# Patient Record
Sex: FEMALE | Race: WHITE | NOT HISPANIC OR LATINO | Employment: OTHER | URBAN - NONMETROPOLITAN AREA
[De-identification: names, ages, dates, MRNs, and addresses within clinical notes are randomized per-mention and may not be internally consistent; named-entity substitution may affect disease eponyms.]

---

## 2017-01-09 ENCOUNTER — OFFICE VISIT (OUTPATIENT)
Dept: INTERNAL MEDICINE | Facility: CLINIC | Age: 67
End: 2017-01-09

## 2017-01-09 VITALS
TEMPERATURE: 96.8 F | RESPIRATION RATE: 14 BRPM | OXYGEN SATURATION: 98 % | BODY MASS INDEX: 28.01 KG/M2 | WEIGHT: 152.19 LBS | HEART RATE: 78 BPM | DIASTOLIC BLOOD PRESSURE: 68 MMHG | HEIGHT: 62 IN | SYSTOLIC BLOOD PRESSURE: 110 MMHG

## 2017-01-09 DIAGNOSIS — M79.644 FINGER PAIN, RIGHT: ICD-10-CM

## 2017-01-09 DIAGNOSIS — J01.40 ACUTE NON-RECURRENT PANSINUSITIS: Primary | ICD-10-CM

## 2017-01-09 PROCEDURE — 99213 OFFICE O/P EST LOW 20 MIN: CPT | Performed by: PHYSICIAN ASSISTANT

## 2017-01-09 RX ORDER — AZELASTINE 1 MG/ML
2 SPRAY, METERED NASAL 2 TIMES DAILY
Qty: 1 EACH | Refills: 12 | Status: SHIPPED | OUTPATIENT
Start: 2017-01-09 | End: 2018-05-03

## 2017-01-09 RX ORDER — LIDOCAINE AND PRILOCAINE 25; 25 MG/G; MG/G
CREAM TOPICAL AS NEEDED
COMMUNITY
Start: 2016-12-14 | End: 2018-10-31

## 2017-01-09 RX ORDER — CEFPROZIL 500 MG/1
500 TABLET, FILM COATED ORAL 2 TIMES DAILY
Qty: 20 TABLET | Refills: 0 | Status: SHIPPED | OUTPATIENT
Start: 2017-01-09 | End: 2017-01-19

## 2017-01-09 NOTE — MR AVS SNAPSHOT
Dang Butler   1/9/2017 2:00 PM   Office Visit    Provider:  STEVEN Eastman   Department:  [facility] PRIMARY [facility]   Dept Phone:  [phone]                Your Full Care Plan              Today's Medication Changes          These changes are accurate as of: 1/9/17  3:42 PM.  If you have any questions, ask your nurse or doctor.               New Medication(s)Ordered:     azelastine 0.1 % nasal spray   Commonly known as:  ASTELIN   2 sprays into each nostril 2 (Two) Times a Day. Use in each nostril as directed       cefprozil 500 MG tablet   Commonly known as:  CEFZIL   Take 1 tablet by mouth 2 (Two) Times a Day for 10 days.            Where to Get Your Medications      [place] [address] [address] [facility] [phone] [facility] [phone] [facility] [address]     Phone:  [phone]     azelastine 0.1 % nasal spray    cefprozil 500 MG tablet                  Your Updated Medication List          This list is accurate as of: 1/9/17  3:42 PM.  Always use your most recent med list.                azelastine 0.1 % nasal spray   Commonly known as:  ASTELIN   2 sprays into each nostril 2 (Two) Times a Day. Use in each nostril as directed       cefprozil 500 MG tablet   Commonly known as:  CEFZIL   Take 1 tablet by mouth 2 (Two) Times a Day for 10 days.       cetirizine 10 MG tablet   Commonly known as:  zyrTEC       estradiol 0.1 MG/GM vaginal cream   Commonly known as:  ESTRACE   Insert 2 g into the vagina daily. Apply a small amount three times weekly       fluocinonide 0.05 % cream   Commonly known as:  LIDEX       levothyroxine 137 MCG tablet   Commonly known as:  SYNTHROID, LEVOTHROID   Take 1 tablet by mouth Daily. As directed       lidocaine-prilocaine 2.5-2.5 % cream   Commonly known as:  EMLA       montelukast 10 MG tablet   Commonly known as:  SINGULAIR   Take 1 tablet  by mouth every night.       pantoprazole 40 MG EC tablet   Commonly known as:  PROTONIX   Take 1 tablet by mouth Daily.       promethazine-dextromethorphan 6.25-15 MG/5ML syrup   Commonly known as:  PROMETHAZINE-DM   Take 5-10 mL by mouth at bedtime as needed for cough.       raNITIdine 150 MG tablet   Commonly known as:  ZANTAC   Take 1 tablet by mouth 2 (Two) Times a Day. For reflux       triamcinolone 0.1 % cream   Commonly known as:  KENALOG   Apply  topically 2 (two) times a day.       zolpidem 10 MG tablet   Commonly known as:  AMBIEN   Take 1 tablet by mouth At Night As Needed for sleep.               You Were Diagnosed With        Codes Comments    Acute non-recurrent pansinusitis    -  Primary ICD-10-CM: J01.40  ICD-9-CM: 461.8     Finger pain, right     ICD-10-CM: M79.644  ICD-9-CM: 729.5       Instructions     None    Patient Instructions History      [employer] Signup     Our records indicate that you have an active [employer] account.    You can view your After Visit Summary by going to BERD and logging in with your [employer] username and password.  If you don't have a [employer] username and password but a parent or guardian has access to your record, the parent or guardian should login with their own [employer] username and password and access your record to view the After Visit Summary.    If you have questions, you can email [email or web address] or call [phone] to talk to our [employer] staff.  Remember, [employer] is NOT to be used for urgent needs.  For medical emergencies, dial 911.               Other Info from Your Visit           Your Appointments     Apr 27, 2017  1:15 PM EDT   Follow Up with Marilyn K Vermeesch, MD   [facility] (--)    [address] [place] [address] [address]  [facility] [address] [phone]           Arrive 15 minutes prior to appointment.              Allergies     Amoxicillin        Reason for Visit     Sinus Problem for  "over a week, usually only tolerates Cefzil.       Vital Signs     Blood Pressure Pulse Temperature Respirations Height Weight    110/68 78 96.8 °F (36 °C) 14 62\" (157.5 cm) 152 lb 3 oz (69 kg)    Oxygen Saturation Body Mass Index Smoking Status             98% 27.84 kg/m2 Never Smoker         Problems and Diagnoses Noted     Acute non-recurrent pansinusitis    -  Primary    Finger pain, right          "

## 2017-03-14 ENCOUNTER — TELEPHONE (OUTPATIENT)
Dept: INTERNAL MEDICINE | Facility: CLINIC | Age: 67
End: 2017-03-14

## 2017-03-14 DIAGNOSIS — G47.09 OTHER INSOMNIA: ICD-10-CM

## 2017-03-14 RX ORDER — ZOLPIDEM TARTRATE 10 MG/1
10 TABLET ORAL NIGHTLY PRN
Qty: 30 TABLET | Refills: 2 | OUTPATIENT
Start: 2017-03-14 | End: 2017-06-12 | Stop reason: SDUPTHER

## 2017-03-14 NOTE — TELEPHONE ENCOUNTER
----- Message from Dang Butler sent at 3/14/2017  2:20 PM EDT -----  Regarding: Prescription Question  Contact: [phone]  I am needing to get Amb[facility] please[facility]    Thank you

## 2017-03-30 DIAGNOSIS — R14.0 ABDOMINAL BLOATING: Primary | ICD-10-CM

## 2017-05-10 ENCOUNTER — LAB REQUISITION (OUTPATIENT)
Dept: LAB | Facility: HOSPITAL | Age: 67
End: 2017-05-10

## 2017-05-10 ENCOUNTER — OFFICE VISIT (OUTPATIENT)
Dept: INTERNAL MEDICINE | Facility: CLINIC | Age: 67
End: 2017-05-10

## 2017-05-10 VITALS
HEIGHT: 62 IN | WEIGHT: 152.25 LBS | BODY MASS INDEX: 28.02 KG/M2 | TEMPERATURE: 97.7 F | OXYGEN SATURATION: 98 % | DIASTOLIC BLOOD PRESSURE: 70 MMHG | SYSTOLIC BLOOD PRESSURE: 116 MMHG | HEART RATE: 71 BPM

## 2017-05-10 DIAGNOSIS — K21.9 GASTROESOPHAGEAL REFLUX DISEASE, ESOPHAGITIS PRESENCE NOT SPECIFIED: ICD-10-CM

## 2017-05-10 DIAGNOSIS — E07.9 THYROID DISORDER: ICD-10-CM

## 2017-05-10 DIAGNOSIS — E78.2 MIXED HYPERLIPIDEMIA: Primary | ICD-10-CM

## 2017-05-10 DIAGNOSIS — E55.9 VITAMIN D DEFICIENCY: ICD-10-CM

## 2017-05-10 DIAGNOSIS — E07.9 DISORDER OF THYROID: ICD-10-CM

## 2017-05-10 DIAGNOSIS — R10.13 EPIGASTRIC PAIN: ICD-10-CM

## 2017-05-10 LAB
BUN SERPL-MCNC: 17 MG/DL (ref 7–20)
BUN/CREAT SERPL: 21.3 (ref 7.1–23.5)
CALCIUM SERPL-MCNC: 9.6 MG/DL (ref 8.4–10.2)
CHLORIDE SERPL-SCNC: 103 MMOL/L (ref 98–107)
CO2 SERPL-SCNC: 27 MMOL/L (ref 26–30)
CREAT SERPL-MCNC: 0.8 MG/DL (ref 0.6–1.3)
GLUCOSE SERPL-MCNC: 74 MG/DL (ref 74–98)
POTASSIUM SERPL-SCNC: 4.3 MMOL/L (ref 3.5–5.1)
SODIUM SERPL-SCNC: 141 MMOL/L (ref 137–145)

## 2017-05-10 PROCEDURE — 87338 HPYLORI STOOL AG IA: CPT | Performed by: INTERNAL MEDICINE

## 2017-05-10 PROCEDURE — 99213 OFFICE O/P EST LOW 20 MIN: CPT | Performed by: INTERNAL MEDICINE

## 2017-05-10 RX ORDER — LEVOTHYROXINE SODIUM 137 UG/1
137 TABLET ORAL DAILY
Qty: 90 TABLET | Refills: 1 | Status: SHIPPED | OUTPATIENT
Start: 2017-05-10 | End: 2017-11-07 | Stop reason: SDUPTHER

## 2017-05-11 ENCOUNTER — TELEPHONE (OUTPATIENT)
Dept: INTERNAL MEDICINE | Facility: CLINIC | Age: 67
End: 2017-05-11

## 2017-05-12 LAB — H PYLORI AG STL QL IA: NEGATIVE

## 2017-06-12 DIAGNOSIS — G47.09 OTHER INSOMNIA: ICD-10-CM

## 2017-06-12 RX ORDER — ZOLPIDEM TARTRATE 10 MG/1
10 TABLET ORAL NIGHTLY PRN
Qty: 30 TABLET | Refills: 2 | OUTPATIENT
Start: 2017-06-12 | End: 2017-06-13 | Stop reason: SDUPTHER

## 2017-06-12 RX ORDER — PANTOPRAZOLE SODIUM 40 MG/1
40 TABLET, DELAYED RELEASE ORAL DAILY
Qty: 90 TABLET | Refills: 3 | Status: SHIPPED | OUTPATIENT
Start: 2017-06-12 | End: 2018-07-05 | Stop reason: SDUPTHER

## 2017-06-12 NOTE — TELEPHONE ENCOUNTER
----- Message from Dang Butler sent at 6/11/2017  3:09 PM EDT -----  Regarding: Prescription Question  Contact: [phone]  Garcia urrutia & pantoprazole refill - [facility] pharmacy     Thanks

## 2017-06-13 RX ORDER — ZOLPIDEM TARTRATE 10 MG/1
10 TABLET ORAL NIGHTLY PRN
Qty: 30 TABLET | Refills: 2 | OUTPATIENT
Start: 2017-06-13 | End: 2018-01-18 | Stop reason: SDUPTHER

## 2017-07-26 ENCOUNTER — OFFICE VISIT (OUTPATIENT)
Dept: INTERNAL MEDICINE | Facility: CLINIC | Age: 67
End: 2017-07-26

## 2017-07-26 VITALS
DIASTOLIC BLOOD PRESSURE: 66 MMHG | SYSTOLIC BLOOD PRESSURE: 118 MMHG | TEMPERATURE: 97.9 F | OXYGEN SATURATION: 98 % | HEART RATE: 73 BPM

## 2017-07-26 DIAGNOSIS — M25.561 ACUTE PAIN OF RIGHT KNEE: Primary | ICD-10-CM

## 2017-07-26 PROCEDURE — 99213 OFFICE O/P EST LOW 20 MIN: CPT | Performed by: INTERNAL MEDICINE

## 2017-07-26 NOTE — PROGRESS NOTES
Chief Complaint   Patient presents with   • Abstract     Pt states she slipped and hit right knee on the wall about 6 weeks ago. Pt states she's still having knee pain.      Subjective   Dang Butler is a 66 y.o. female.     HPI Comments: Pt here with complaints of right knee pain for past 6 weeks.   She slipped on a dryer sheet and landed knee down on the wall and floor.   Had some swelling, she applied ice for a few days.   Has pain posterior to knee and over patella, but pain radiates down posterior calf, over achilles tendon to her foot.   The pain is constant, but intensity comes and goes.   Has been applying heat around leg lately and this helps some.   At night she occasionally awakens to take aleve.  Knee does not feel as though it will give way.   She did see the chiropractor, does help for a few days.  No XR was done.        The following portions of the patient's history were reviewed and updated as appropriate: allergies, current medications, past family history, past medical history, past social history, past surgical history and problem list.    Review of Systems   Musculoskeletal: Positive for arthralgias and gait problem. Negative for joint swelling.       Objective   /66  Pulse 73  Temp 97.9 °F (36.6 °C)  SpO2 98%  There is no height or weight on file to calculate BMI.  Physical Exam   Constitutional: She is oriented to person, place, and time. She appears well-developed and well-nourished.   HENT:   Head: Normocephalic and atraumatic.   Pulmonary/Chest: Effort normal.   Musculoskeletal:   Minimally antalgic gait due to pain, right knee with minimal inflammation, no warmth, no ballottement or effusion, no pain to palpation of patella, no pain over posterior fossa, full extension with no pain, patient has pain with flexion past 110°, positive drawer sign, negative Lachman sign, no pain with eversion or inversion   Neurological: She is alert and oriented to person, place, and time.    Psychiatric: She has a normal mood and affect. Judgment normal.   Nursing note reviewed.      Assessment/Plan   Dang Butler is here today and the following problems have been addressed:      Dang was seen today for abstract.    Diagnoses and all orders for this visit:    Acute pain of right knee  -     XR Knee 3 View Right; Future    XR right knee  Take tylenol or aleve for pain  Recommend heat to knee prn  Suspect internal derangement of knee, possible ACL or PCL injury based on history and exam  Will call pt with results of XRay, suspect she will need MRI      Please note that portions of this note were completed with a voice recognition program.  Efforts were made to edit dictation, but occasionally words are mistranscribed.

## 2017-07-27 ENCOUNTER — HOSPITAL ENCOUNTER (OUTPATIENT)
Dept: GENERAL RADIOLOGY | Facility: HOSPITAL | Age: 67
Discharge: HOME | End: 2017-07-27
Attending: INTERNAL MEDICINE | Admitting: INTERNAL MEDICINE

## 2017-07-27 DIAGNOSIS — M25.561 ACUTE PAIN OF RIGHT KNEE: ICD-10-CM

## 2017-07-27 PROCEDURE — 73562 X-RAY EXAM OF KNEE 3: CPT

## 2017-07-28 ENCOUNTER — TELEPHONE (OUTPATIENT)
Dept: INTERNAL MEDICINE | Facility: CLINIC | Age: 67
End: 2017-07-28

## 2017-07-28 DIAGNOSIS — M25.561 ACUTE PAIN OF RIGHT KNEE: Primary | ICD-10-CM

## 2017-07-28 NOTE — TELEPHONE ENCOUNTER
----- Message from Marilyn K Vermeesch, MD sent at 7/28/2017  3:40 PM EDT -----  Please tell patient that x-ray reveals arthritis changes in her knee.  I am ordering an MRI of her right knee to rule out underlying tear of her anterior or posterior cruciate ligament.

## 2017-08-10 ENCOUNTER — HOSPITAL ENCOUNTER (OUTPATIENT)
Dept: MRI IMAGING | Facility: HOSPITAL | Age: 67
Discharge: HOME | End: 2017-08-10
Admitting: INTERNAL MEDICINE

## 2017-08-10 ENCOUNTER — TELEPHONE (OUTPATIENT)
Dept: INTERNAL MEDICINE | Facility: CLINIC | Age: 67
End: 2017-08-10

## 2017-08-10 DIAGNOSIS — M25.561 ACUTE PAIN OF RIGHT KNEE: ICD-10-CM

## 2017-08-10 PROCEDURE — 73721 MRI JNT OF LWR EXTRE W/O DYE: CPT

## 2017-08-10 NOTE — TELEPHONE ENCOUNTER
Patient is calling in regards to her MRI done today. She would like a callback with the results.

## 2017-08-11 ENCOUNTER — TELEPHONE (OUTPATIENT)
Dept: INTERNAL MEDICINE | Facility: CLINIC | Age: 67
End: 2017-08-11

## 2017-08-11 DIAGNOSIS — S83.206A ACUTE MENISCAL TEAR OF KNEE, RIGHT, INITIAL ENCOUNTER: Primary | ICD-10-CM

## 2017-08-11 NOTE — TELEPHONE ENCOUNTER
----- Message from Marilyn K Vermeesch, MD sent at 8/10/2017  5:16 PM EDT -----  Please tell patient that MRI reveals tear of her posterior cruciate ligament, tear of the medial meniscus as well as osteoarthritis and a large Baker's cyst.  She will need to see orthopedic surgeon, does she have a preference of whom she wants to se  e?

## 2017-08-11 NOTE — TELEPHONE ENCOUNTER
Pt notified of results. Pt states Dr. Springer was recommended to her but she would like to know what you think. If you agree, then go ahead with referral.

## 2017-08-15 ENCOUNTER — TELEPHONE (OUTPATIENT)
Dept: INTERNAL MEDICINE | Facility: CLINIC | Age: 67
End: 2017-08-15

## 2017-08-15 NOTE — TELEPHONE ENCOUNTER
CALLED TO [facility] - PATIENT INFORMED OF DIRECTIONS. SHE STATES SHE HAS BEEN ON THIS MEDICATION FOR 20 YEARS - SHE HAS TRIED CUTTING DOWN IN THE PAST AND IT JUST DID NOT WORK. HER NEXT APPOINTMENT IS November. PLEASE ADVISE ON ANY CHANGES OR IF YOU WOULD LIKE FOR [facility]ZA TO COME IN.

## 2017-08-15 NOTE — TELEPHONE ENCOUNTER
Please call her and tell her I really would like her to work with me on this, and tell her that some of your patients have also complained of memory loss regarding this medication.  I do not want to go up to 30 tablets a month, 25 tablets.

## 2017-08-15 NOTE — TELEPHONE ENCOUNTER
Please call in Ambien 10 mg by mouth daily at bedtime when necessary only #25 tablets with 2 refills.  Please tell patient I want her to start working on using a half a tablet once in a while, as physicians are now being monitored closely with controlled substances .  Please tell her I'm asking her to work with me on this.

## 2017-08-15 NOTE — TELEPHONE ENCOUNTER
----- Message from Dang Butler sent at 8/15/2017 10:16 AM EDT -----  Regarding: Prescription Question  Contact: [phone]  I am in need of ambian refill - Thank you    [facility] pharmacy

## 2017-08-15 NOTE — TELEPHONE ENCOUNTER
PATIENT INFORMED - SHE SAYS SHE WILL TRY TO WORK WITH US ON IT. SHE IS AWARE OF ALL THE SIDE EFFECTS, AND MEMORY LOSS, SHE SAYS AFTER 25 YEARS AMBIEN IS THE ONLY THING THAT HELPS A LITTLE

## 2017-09-07 RX ORDER — MONTELUKAST SODIUM 10 MG/1
TABLET ORAL
Qty: 30 TABLET | Refills: 10 | Status: SHIPPED | OUTPATIENT
Start: 2017-09-07 | End: 2018-08-08 | Stop reason: SDUPTHER

## 2017-11-03 ENCOUNTER — OFFICE VISIT (OUTPATIENT)
Dept: INTERNAL MEDICINE | Facility: CLINIC | Age: 67
End: 2017-11-03

## 2017-11-03 VITALS
HEIGHT: 62 IN | TEMPERATURE: 97.5 F | DIASTOLIC BLOOD PRESSURE: 68 MMHG | WEIGHT: 153 LBS | OXYGEN SATURATION: 96 % | HEART RATE: 70 BPM | SYSTOLIC BLOOD PRESSURE: 122 MMHG | BODY MASS INDEX: 28.16 KG/M2

## 2017-11-03 DIAGNOSIS — F51.01 PRIMARY INSOMNIA: ICD-10-CM

## 2017-11-03 DIAGNOSIS — K21.9 GASTROESOPHAGEAL REFLUX DISEASE WITHOUT ESOPHAGITIS: ICD-10-CM

## 2017-11-03 DIAGNOSIS — E07.9 THYROID DISORDER: Primary | ICD-10-CM

## 2017-11-03 DIAGNOSIS — R19.5 LOOSE STOOLS: ICD-10-CM

## 2017-11-03 DIAGNOSIS — E55.9 VITAMIN D DEFICIENCY: ICD-10-CM

## 2017-11-03 PROBLEM — M25.561 ACUTE PAIN OF RIGHT KNEE: Status: RESOLVED | Noted: 2017-07-26 | Resolved: 2017-11-03

## 2017-11-03 PROCEDURE — 99214 OFFICE O/P EST MOD 30 MIN: CPT | Performed by: INTERNAL MEDICINE

## 2017-11-03 PROCEDURE — 90662 IIV NO PRSV INCREASED AG IM: CPT | Performed by: INTERNAL MEDICINE

## 2017-11-03 PROCEDURE — G0008 ADMIN INFLUENZA VIRUS VAC: HCPCS | Performed by: INTERNAL MEDICINE

## 2017-11-03 RX ORDER — DESOXIMETASONE 0.5 MG/G
CREAM TOPICAL DAILY
Qty: 60 G | Refills: 1 | Status: SHIPPED | OUTPATIENT
Start: 2017-11-03 | End: 2018-10-25 | Stop reason: SDUPTHER

## 2017-11-03 NOTE — PROGRESS NOTES
"Chief Complaint   Patient presents with   • Follow-up     6 months for GERD, HLD, and Thyroid disorder. Pt is requesting refill on Topicort, not on med list, pended for approval.      Subjective   Dang Butler is a 67 y.o. female.     HPI Comments: Here for followup visit.  PMH of HLD, hypothyroid, allergies, GERD and insomnia.   She is using protonix in AM on empty stomach and takes zantac about twice a week in PM.   She tries to follow a gluten free diet, but if she eats gluten she has sxs.  She ate bread earlier this week and felt fatigued and throat felt itchy.  She always feels bloated and has some loose stools.  She is sleeping poorly currently. She takes ambien every night to sleep.  She is currently on 3/4 tab.  She is taking vit D 1000 u daily.   Her allergies are not too bad, currently on zyrtec with prn singulair.   She is using estrace cream every other night, this helps her bladder prolapse.  This is working well for her.   Vaccines are UTD.  She declines colonoscopy.   She had mammogram in June this yr at [facility].       The following portions of the patient's history were reviewed and updated as appropriate: allergies, current medications, past family history, past medical history, past social history, past surgical history and problem list.    Review of Systems   Gastrointestinal:        Loose and frequent stools  Rare GERD symptoms unless patient eats something spicy   Endocrine: Negative for cold intolerance and heat intolerance.   Allergic/Immunologic: Positive for environmental allergies.   Psychiatric/Behavioral: Positive for sleep disturbance.   All other systems reviewed and are negative.      Objective   /68  Pulse 70  Temp 97.5 °F (36.4 °C)  Ht 62\" (157.5 cm)  Wt 153 lb (69.4 kg)  SpO2 96%  BMI 27.98 kg/m2  Body mass index is 27.98 kg/(m^2).  Physical Exam   Constitutional: She is oriented to person, place, and time. She appears well-developed and well-nourished.   HENT: "   Head: Normocephalic and atraumatic.   Mouth/Throat: Oropharynx is clear and moist.   Eyes: Conjunctivae and EOM are normal. Pupils are equal, round, and reactive to light.   Neck: Normal range of motion. Neck supple. No thyromegaly present.   Cardiovascular: Normal rate, regular rhythm, normal heart sounds and intact distal pulses.    No murmur heard.  Pulmonary/Chest: Effort normal and breath sounds normal. No respiratory distress.   Abdominal: Soft. Bowel sounds are normal. She exhibits no distension. There is no tenderness.   Musculoskeletal: She exhibits no edema.   Lymphadenopathy:     She has no cervical adenopathy.   Neurological: She is alert and oriented to person, place, and time. No cranial nerve deficit.   Psychiatric: She has a normal mood and affect. Judgment normal.   Nursing note and vitals reviewed.      Assessment/Plan   Dang Butler is here today and the following problems have been addressed:      Dang was seen today for follow-up.    Diagnoses and all orders for this visit:    Thyroid disorder  -     TSH    Vitamin D deficiency  -     Vitamin D 25 Hydroxy    Primary insomnia    Gastroesophageal reflux disease without esophagitis    Loose stools  -     Celiac Comprehensive Panel    Other orders  -     desoximetasone (TOPICORT) 0.05 % cream; Apply  topically Daily.    Labs as noted including celiac panel  Flu shot today  No med changes  Continue protonix with prn zantac  Given refill of Topicort for use on rash under breast when necessary  Patient continues to use Ambien every night, however has decreased to three quarters tab daily at bedtime    RTC  6mo  Please note that portions of this note were completed with a voice recognition program.  Efforts were made to edit dictation, but occasionally words are mistranscribed.

## 2017-11-06 LAB
25(OH)D3+25(OH)D2 SERPL-MCNC: 31.2 NG/ML
ENDOMYSIUM IGA SER QL: NEGATIVE
GLIADIN PEPTIDE IGA SER-ACNC: 5 UNITS (ref 0–19)
GLIADIN PEPTIDE IGG SER-ACNC: 2 UNITS (ref 0–19)
IGA SERPL-MCNC: 293 MG/DL (ref 87–352)
TSH SERPL DL<=0.005 MIU/L-ACNC: 0.35 MIU/ML (ref 0.47–4.68)
TTG IGA SER-ACNC: <2 U/ML (ref 0–3)
TTG IGG SER-ACNC: <2 U/ML (ref 0–5)

## 2017-11-07 ENCOUNTER — TELEPHONE (OUTPATIENT)
Dept: INTERNAL MEDICINE | Facility: CLINIC | Age: 67
End: 2017-11-07

## 2017-11-07 DIAGNOSIS — E07.9 DISORDER OF THYROID: ICD-10-CM

## 2017-11-07 DIAGNOSIS — E07.9 THYROID DISORDER: Primary | ICD-10-CM

## 2017-11-07 RX ORDER — LEVOTHYROXINE SODIUM 0.12 MG/1
137 TABLET ORAL DAILY
Qty: 30 TABLET | Refills: 1 | Status: SHIPPED | OUTPATIENT
Start: 2017-11-07 | End: 2017-12-20 | Stop reason: SDUPTHER

## 2017-11-07 NOTE — TELEPHONE ENCOUNTER
Notes Recorded by Marilyn K Vermeesch, MD on 11/7/2017 at 7:47 AM  Please tell patient vitamin D and celiac panel are in normal range.  There is no evidence of celiac disease.  ------

## 2017-11-07 NOTE — TELEPHONE ENCOUNTER
----- Message from Marilyn K Vermeesch, MD sent at 11/7/2017  7:46 AM EST -----  Please tell patient that her thyroid labs suggest we need to decrease her current medication of levothyroxine down to 125 µg daily.  Please call in #30 tablets with one refill.  Please order TSH and free T4 to be done in 6 weeks.  Please tell patient   if her labs are appropriate at that time we will call in refill of medication.

## 2017-11-08 LAB
T4 FREE SERPL-MCNC: 3.47 NG/DL (ref 0.78–2.19)
WRITTEN AUTHORIZATION: NORMAL

## 2017-12-18 LAB
T4 FREE SERPL-MCNC: 2.01 NG/DL (ref 0.78–2.19)
TSH SERPL DL<=0.005 MIU/L-ACNC: 0.47 MIU/ML (ref 0.47–4.68)

## 2017-12-20 ENCOUNTER — TELEPHONE (OUTPATIENT)
Dept: INTERNAL MEDICINE | Facility: CLINIC | Age: 67
End: 2017-12-20

## 2017-12-20 DIAGNOSIS — E07.9 DISORDER OF THYROID: ICD-10-CM

## 2017-12-20 RX ORDER — LEVOTHYROXINE SODIUM 0.12 MG/1
137 TABLET ORAL DAILY
Qty: 30 TABLET | Refills: 5 | Status: SHIPPED | OUTPATIENT
Start: 2017-12-20 | End: 2018-05-09 | Stop reason: SDUPTHER

## 2017-12-20 NOTE — TELEPHONE ENCOUNTER
----- Message from Marilyn K Vermeesch, MD sent at 12/19/2017  7:38 AM EST -----  Please tell patient thyroid labs are now in normal range.  You may call in a 6 month supply of her thyroid medications.  Please tell her we will repeat these thyroid labs on her next office visit to be certain they are stable.

## 2018-01-18 DIAGNOSIS — G47.09 OTHER INSOMNIA: ICD-10-CM

## 2018-01-18 RX ORDER — ZOLPIDEM TARTRATE 10 MG/1
TABLET ORAL
Qty: 25 TABLET | Refills: 1 | Status: SHIPPED | OUTPATIENT
Start: 2018-01-18 | End: 2018-01-19 | Stop reason: SDUPTHER

## 2018-01-19 ENCOUNTER — OFFICE VISIT (OUTPATIENT)
Dept: INTERNAL MEDICINE | Facility: CLINIC | Age: 68
End: 2018-01-19

## 2018-01-19 VITALS — OXYGEN SATURATION: 98 % | HEART RATE: 93 BPM | TEMPERATURE: 97.7 F

## 2018-01-19 DIAGNOSIS — J01.40 ACUTE NON-RECURRENT PANSINUSITIS: Primary | ICD-10-CM

## 2018-01-19 PROBLEM — R19.5 LOOSE STOOLS: Status: RESOLVED | Noted: 2017-11-03 | Resolved: 2018-01-19

## 2018-01-19 PROCEDURE — 99213 OFFICE O/P EST LOW 20 MIN: CPT | Performed by: INTERNAL MEDICINE

## 2018-01-19 RX ORDER — LEVOFLOXACIN 500 MG/1
500 TABLET, FILM COATED ORAL DAILY
Qty: 7 TABLET | Refills: 0 | Status: SHIPPED | OUTPATIENT
Start: 2018-01-19 | End: 2018-05-03

## 2018-01-19 RX ORDER — ZOLPIDEM TARTRATE 10 MG/1
10 TABLET ORAL NIGHTLY PRN
Qty: 30 TABLET | Refills: 2 | OUTPATIENT
Start: 2018-01-19 | End: 2018-04-13 | Stop reason: SDUPTHER

## 2018-01-19 NOTE — PROGRESS NOTES
Chief Complaint   Patient presents with   • Cough     and SOA X 2 weeks.     Subjective   Dang Butler is a 67 y.o. female.     Cough   This is a new problem. The current episode started 1 to 4 weeks ago. The problem has been waxing and waning. The problem occurs hourly. The cough is productive of sputum. Associated symptoms include chills, headaches, myalgias, postnasal drip and rhinorrhea. Pertinent negatives include no ear congestion, ear pain, fever, hemoptysis, nasal congestion, rash, sore throat, shortness of breath, sweats, weight loss or wheezing. Nothing aggravates the symptoms. She has tried OTC cough suppressant (sudafed, aleve, cefprozil BID for 5 days) for the symptoms. The treatment provided mild relief. Her past medical history is significant for environmental allergies.        The following portions of the patient's history were reviewed and updated as appropriate: allergies, current medications, past family history, past medical history, past social history, past surgical history and problem list.    Review of Systems   Constitutional: Positive for chills. Negative for fever and weight loss.   HENT: Positive for postnasal drip and rhinorrhea. Negative for ear pain and sore throat.    Respiratory: Positive for cough. Negative for hemoptysis, shortness of breath and wheezing.    Musculoskeletal: Positive for myalgias.   Skin: Negative for rash.   Allergic/Immunologic: Positive for environmental allergies.   Neurological: Positive for headaches.       Objective   Pulse 93  Temp 97.7 °F (36.5 °C)  SpO2 98%  There is no height or weight on file to calculate BMI.  Physical Exam   Constitutional: She is oriented to person, place, and time. She appears well-developed and well-nourished.   HENT:   Head: Normocephalic and atraumatic.   Right Ear: External ear normal.   Left Ear: External ear normal.   Mouth/Throat: Oropharynx is clear and moist.   Turbinates red and inflamed with white rhinorrhea, white  postnasal drip, maxillary sinus tenderness   Eyes: Conjunctivae and EOM are normal. Pupils are equal, round, and reactive to light.   Neck: Normal range of motion. Neck supple. No thyromegaly present.   Cardiovascular: Normal rate, regular rhythm and normal heart sounds.    No murmur heard.  Pulmonary/Chest: Effort normal and breath sounds normal. No respiratory distress.   Lymphadenopathy:     She has no cervical adenopathy.   Neurological: She is alert and oriented to person, place, and time. No cranial nerve deficit.   Psychiatric: She has a normal mood and affect. Judgment normal.   Nursing note and vitals reviewed.      Assessment/Plan   Dang Butler is here today and the following problems have been addressed:      Dang was seen today for cough.    Diagnoses and all orders for this visit:    Acute non-recurrent pansinusitis    Other orders  -     levoFLOXacin (LEVAQUIN) 500 MG tablet; Take 1 tablet by mouth Daily.    Given levaquin 500 mg q day for 7 days  Recommend she continue once a day sudafed  Increase fluids and rest  May continue cough drops, but also recommend delsym cough syrup    RTC if sxs worsen or persist  Please note that portions of this note were completed with a voice recognition program.  Efforts were made to edit dictation, but occasionally words are mistranscribed.

## 2018-01-24 ENCOUNTER — TELEPHONE (OUTPATIENT)
Dept: INTERNAL MEDICINE | Facility: CLINIC | Age: 68
End: 2018-01-24

## 2018-01-24 ENCOUNTER — CLINICAL SUPPORT (OUTPATIENT)
Dept: INTERNAL MEDICINE | Facility: CLINIC | Age: 68
End: 2018-01-24

## 2018-01-24 DIAGNOSIS — R39.9 UTI SYMPTOMS: Primary | ICD-10-CM

## 2018-01-24 LAB
BILIRUB BLD-MCNC: ABNORMAL MG/DL
CLARITY, POC: ABNORMAL
COLOR UR: ABNORMAL
GLUCOSE UR STRIP-MCNC: NEGATIVE MG/DL
KETONES UR QL: NEGATIVE
LEUKOCYTE EST, POC: ABNORMAL
NITRITE UR-MCNC: POSITIVE MG/ML
PH UR: 5 [PH] (ref 5–8)
PROT UR STRIP-MCNC: ABNORMAL MG/DL
RBC # UR STRIP: ABNORMAL /UL
SP GR UR: 1.01 (ref 1–1.03)
UROBILINOGEN UR QL: ABNORMAL

## 2018-01-24 PROCEDURE — 81003 URINALYSIS AUTO W/O SCOPE: CPT | Performed by: INTERNAL MEDICINE

## 2018-01-24 NOTE — TELEPHONE ENCOUNTER
----- Message from Marilyn K Vermeesch, MD sent at 1/24/2018  4:40 PM EST -----  Urinalysis is noted.  As we discussed, please tell patient to take Levaquin that was given to her earlier in the week.

## 2018-01-27 LAB
BACTERIA UR CULT: ABNORMAL
BACTERIA UR CULT: ABNORMAL
OTHER ANTIBIOTIC SUSC ISLT: ABNORMAL

## 2018-02-21 ENCOUNTER — TELEPHONE (OUTPATIENT)
Dept: INTERNAL MEDICINE | Facility: CLINIC | Age: 68
End: 2018-02-21

## 2018-02-21 RX ORDER — MELOXICAM 15 MG/1
15 TABLET ORAL DAILY PRN
Qty: 30 TABLET | Refills: 3 | Status: SHIPPED | OUTPATIENT
Start: 2018-02-21 | End: 2018-05-03 | Stop reason: SDUPTHER

## 2018-02-21 NOTE — TELEPHONE ENCOUNTER
----- Message from Marilyn K Vermeesch, MD sent at 2/21/2018 12:39 PM EST -----  Regarding: RE: Non-Urgent Medical Question  Contact: [phone]  You may call in meloxicam 15 mg daily #30 tablets with 3 refills. Please tell patient to take this with food and only use it as needed    ----- Message -----     From: Adriana Aquino MA     Sent: 2/21/2018  12:27 PM       To: Marilyn K Vermeesch, MD  Subject: FW: Non-Urgent Medical Question                      ----- Message -----     From: Dang Butler     Sent: 2/21/2018  12:01 PM       To: Jb Godoy [facility]  Subject: Non-Urgent Medical Question                      A few months ago Dr Kebede gave me Meloxicam 15 for extreme arthritis in my right knee. Is this something Dr De Paz can prescribe to me or do I need to continue seeing him to prescribe medication?

## 2018-03-06 ENCOUNTER — TELEPHONE (OUTPATIENT)
Dept: INTERNAL MEDICINE | Facility: CLINIC | Age: 68
End: 2018-03-06

## 2018-03-06 DIAGNOSIS — N95.9 MENOPAUSAL AND POSTMENOPAUSAL DISORDER: ICD-10-CM

## 2018-03-06 NOTE — TELEPHONE ENCOUNTER
PATIENT REQUESTING A CALL BACK REGARDING A REFILL SHE JUST GOT OF ESTRADIOL, HAS QUESTIONS ABOUT THE MEDICATION.

## 2018-03-07 RX ORDER — ESTRADIOL 0.1 MG/G
2 CREAM VAGINAL 3 TIMES WEEKLY
Qty: 45 G | Refills: 2 | Status: SHIPPED | OUTPATIENT
Start: 2018-03-07 | End: 2019-04-25

## 2018-03-07 NOTE — TELEPHONE ENCOUNTER
Spoke with Pt in regards to medication, Pt states that the brand name is cheaper than the generic. Pt states that the Pharmacy fills it how it comes from us, advised Pt that I have changed the script to be brand name only.

## 2018-04-13 DIAGNOSIS — G47.09 OTHER INSOMNIA: ICD-10-CM

## 2018-04-13 RX ORDER — ZOLPIDEM TARTRATE 10 MG/1
TABLET ORAL
Qty: 30 TABLET | Refills: 2 | OUTPATIENT
Start: 2018-04-13 | End: 2018-07-12 | Stop reason: SDUPTHER

## 2018-05-03 ENCOUNTER — OFFICE VISIT (OUTPATIENT)
Dept: INTERNAL MEDICINE | Facility: CLINIC | Age: 68
End: 2018-05-03

## 2018-05-03 VITALS
SYSTOLIC BLOOD PRESSURE: 124 MMHG | WEIGHT: 154 LBS | HEART RATE: 74 BPM | DIASTOLIC BLOOD PRESSURE: 70 MMHG | TEMPERATURE: 98 F | BODY MASS INDEX: 28.34 KG/M2 | OXYGEN SATURATION: 97 % | HEIGHT: 62 IN

## 2018-05-03 DIAGNOSIS — E07.9 DISORDER OF THYROID: ICD-10-CM

## 2018-05-03 DIAGNOSIS — E55.9 VITAMIN D DEFICIENCY: ICD-10-CM

## 2018-05-03 DIAGNOSIS — F51.01 PRIMARY INSOMNIA: ICD-10-CM

## 2018-05-03 DIAGNOSIS — E07.9 THYROID DISORDER: ICD-10-CM

## 2018-05-03 DIAGNOSIS — E78.2 MIXED HYPERLIPIDEMIA: Primary | ICD-10-CM

## 2018-05-03 DIAGNOSIS — K21.9 GASTROESOPHAGEAL REFLUX DISEASE WITHOUT ESOPHAGITIS: ICD-10-CM

## 2018-05-03 PROBLEM — J01.40 ACUTE NON-RECURRENT PANSINUSITIS: Status: RESOLVED | Noted: 2018-01-19 | Resolved: 2018-05-03

## 2018-05-03 PROCEDURE — 99213 OFFICE O/P EST LOW 20 MIN: CPT | Performed by: INTERNAL MEDICINE

## 2018-05-03 PROCEDURE — G0009 ADMIN PNEUMOCOCCAL VACCINE: HCPCS | Performed by: INTERNAL MEDICINE

## 2018-05-03 PROCEDURE — 90732 PPSV23 VACC 2 YRS+ SUBQ/IM: CPT | Performed by: INTERNAL MEDICINE

## 2018-05-03 RX ORDER — MELOXICAM 15 MG/1
15 TABLET ORAL DAILY PRN
Qty: 30 TABLET | Refills: 3 | Status: SHIPPED | OUTPATIENT
Start: 2018-05-03 | End: 2019-04-25 | Stop reason: SDUPTHER

## 2018-05-03 RX ORDER — CALCIUM CARBONATE 300MG(750)
TABLET,CHEWABLE ORAL
COMMUNITY
Start: 2017-06-15 | End: 2020-02-05 | Stop reason: SDUPTHER

## 2018-05-03 RX ORDER — RANITIDINE 150 MG/1
150 TABLET ORAL 2 TIMES DAILY
Qty: 180 TABLET | Refills: 3 | Status: SHIPPED | OUTPATIENT
Start: 2018-05-03 | End: 2019-04-25 | Stop reason: SDUPTHER

## 2018-05-03 NOTE — PROGRESS NOTES
"Chief Complaint   Patient presents with   • Follow-up     6 months for GERD, HLD, and Thyroid disorder. Pt states she's been having moments around 5pm off and on of feeling shakey.      Subjective   Dang Butler is a 67 y.o. female.     Here for followup visit.  PMH of HLD, hypothyroid, allergies, GERD and insomnia.   She is compliant with thyroid medication, takes it daily on empty stomach.  GERD is controlled with Protonix alternating with zantac every other day.  Her GERD is improved with following gluten free diet.   Her congestion is also better since off gluten.   She continues to use Ambien approximately 3/4-1 tab every night for sleep.  She tries to avoid fast/fatty/fried foods.  She is not exercising.   She has episodes of feeling shakey later in the day.  Also feels hungry.  It is usually just before her evening meal.  She has tried eating something and she usually does feel better shortly afterwards.    Her allergies are doing well on singulair.   Mammogram is UTD until June.   Pneumovax today.           The following portions of the patient's history were reviewed and updated as appropriate: allergies, current medications, past family history, past medical history, past social history, past surgical history and problem list.    Review of Systems   HENT: Negative for congestion.    Respiratory: Negative for shortness of breath.    Cardiovascular: Negative for chest pain and palpitations.   Gastrointestinal: Negative.    Musculoskeletal: Positive for arthralgias. Negative for myalgias.   Allergic/Immunologic: Negative for environmental allergies.   Neurological:        Occasional episodes of shaking just before evening meal   Psychiatric/Behavioral: Positive for sleep disturbance.   All other systems reviewed and are negative.      Objective   /70   Pulse 74   Temp 98 °F (36.7 °C)   Ht 157.5 cm (62\")   Wt 69.9 kg (154 lb)   SpO2 97%   BMI 28.17 kg/m²   Body mass index is 28.17 kg/m².  Physical " Exam   Constitutional: She is oriented to person, place, and time. She appears well-developed and well-nourished.   HENT:   Head: Normocephalic and atraumatic.   Mouth/Throat: Oropharynx is clear and moist.   Eyes: Conjunctivae and EOM are normal. Pupils are equal, round, and reactive to light.   Neck: Normal range of motion. Neck supple. No thyromegaly present.   Cardiovascular: Normal rate, regular rhythm, normal heart sounds and intact distal pulses.    No murmur heard.  Pulmonary/Chest: Effort normal and breath sounds normal. No respiratory distress.   Abdominal: Soft. Bowel sounds are normal.   Musculoskeletal: She exhibits no edema.   Lymphadenopathy:     She has no cervical adenopathy.   Neurological: She is alert and oriented to person, place, and time. No cranial nerve deficit.   Psychiatric: She has a normal mood and affect. Judgment normal.   Nursing note and vitals reviewed.      Assessment/Plan   Dang Butler is here today and the following problems have been addressed:      Dang was seen today for follow-up.    Diagnoses and all orders for this visit:    Mixed hyperlipidemia  -     Comprehensive Metabolic Panel  -     Lipid Panel    Disorder of thyroid    Gastroesophageal reflux disease without esophagitis  -     CBC & Differential    Vitamin D deficiency    Thyroid disorder  -     T4, Free  -     TSH    Primary insomnia    Other orders  -     raNITIdine (ZANTAC) 150 MG tablet; Take 1 tablet by mouth 2 (Two) Times a Day. For reflux  -     meloxicam (MOBIC) 15 MG tablet; Take 1 tablet by mouth Daily As Needed for Mild Pain .    Follow heart healthy/low cholesterol diet  Avoid processed/fried foods/fast food  Exercise as tolerated up to 30 minutes 5 days a week  Take all medications as prescribed  Labs as noted  Pneumovax given today  Continue protonix and zantac for GERD  Mammogram is up-to-date  Patient declines colonoscopies  Encourage patient to eat mid afternoon snack to prevent low blood sugars  later in the evening    RTC 6 mo or prn  Please note that portions of this note were completed with a voice recognition program.  Efforts were made to edit dictation, but occasionally words are mistranscribed.

## 2018-05-08 LAB
ALBUMIN SERPL-MCNC: 4.2 G/DL (ref 3.5–5)
ALBUMIN/GLOB SERPL: 1.4 G/DL (ref 1–2)
ALP SERPL-CCNC: 99 U/L (ref 38–126)
ALT SERPL-CCNC: 27 U/L (ref 13–69)
AST SERPL-CCNC: 23 U/L (ref 15–46)
BASOPHILS # BLD AUTO: 0.03 10*3/MM3 (ref 0–0.2)
BASOPHILS NFR BLD AUTO: 0.7 % (ref 0–2.5)
BILIRUB SERPL-MCNC: 0.6 MG/DL (ref 0.2–1.3)
BUN SERPL-MCNC: 16 MG/DL (ref 7–20)
BUN/CREAT SERPL: 20 (ref 7.1–23.5)
CALCIUM SERPL-MCNC: 10 MG/DL (ref 8.4–10.2)
CHLORIDE SERPL-SCNC: 104 MMOL/L (ref 98–107)
CHOLEST SERPL-MCNC: 197 MG/DL (ref 0–199)
CO2 SERPL-SCNC: 25 MMOL/L (ref 26–30)
CREAT SERPL-MCNC: 0.8 MG/DL (ref 0.6–1.3)
EOSINOPHIL # BLD AUTO: 0.17 10*3/MM3 (ref 0–0.7)
EOSINOPHIL NFR BLD AUTO: 4.1 % (ref 0–7)
ERYTHROCYTE [DISTWIDTH] IN BLOOD BY AUTOMATED COUNT: 12.6 % (ref 11.5–14.5)
GFR SERPLBLD CREATININE-BSD FMLA CKD-EPI: 72 ML/MIN/1.73
GFR SERPLBLD CREATININE-BSD FMLA CKD-EPI: 87 ML/MIN/1.73
GLOBULIN SER CALC-MCNC: 3.1 GM/DL
GLUCOSE SERPL-MCNC: 89 MG/DL (ref 74–98)
HCT VFR BLD AUTO: 45.3 % (ref 37–47)
HDLC SERPL-MCNC: 52 MG/DL (ref 40–60)
HGB BLD-MCNC: 15 G/DL (ref 12–16)
IMM GRANULOCYTES # BLD: 0.01 10*3/MM3 (ref 0–0.06)
IMM GRANULOCYTES NFR BLD: 0.2 % (ref 0–0.6)
LDLC SERPL CALC-MCNC: 124 MG/DL (ref 0–99)
LYMPHOCYTES # BLD AUTO: 1.31 10*3/MM3 (ref 0.6–3.4)
LYMPHOCYTES NFR BLD AUTO: 32 % (ref 10–50)
MCH RBC QN AUTO: 29.6 PG (ref 27–31)
MCHC RBC AUTO-ENTMCNC: 33.1 G/DL (ref 30–37)
MCV RBC AUTO: 89.5 FL (ref 81–99)
MONOCYTES # BLD AUTO: 0.44 10*3/MM3 (ref 0–0.9)
MONOCYTES NFR BLD AUTO: 10.7 % (ref 0–12)
NEUTROPHILS # BLD AUTO: 2.14 10*3/MM3 (ref 2–6.9)
NEUTROPHILS NFR BLD AUTO: 52.3 % (ref 37–80)
NRBC BLD AUTO-RTO: 0 /100 WBC (ref 0–0)
PLATELET # BLD AUTO: 155 10*3/MM3 (ref 130–400)
POTASSIUM SERPL-SCNC: 4.6 MMOL/L (ref 3.5–5.1)
PROT SERPL-MCNC: 7.3 G/DL (ref 6.3–8.2)
RBC # BLD AUTO: 5.06 10*6/MM3 (ref 4.2–5.4)
SODIUM SERPL-SCNC: 140 MMOL/L (ref 137–145)
T4 FREE SERPL-MCNC: 2.51 NG/DL (ref 0.78–2.19)
TRIGL SERPL-MCNC: 106 MG/DL
TSH SERPL DL<=0.005 MIU/L-ACNC: 0.4 MIU/ML (ref 0.47–4.68)
VLDLC SERPL CALC-MCNC: 21.2 MG/DL
WBC # BLD AUTO: 4.1 10*3/MM3 (ref 4.8–10.8)

## 2018-05-09 DIAGNOSIS — E07.9 THYROID DISORDER: Primary | ICD-10-CM

## 2018-05-09 DIAGNOSIS — E07.9 DISORDER OF THYROID: ICD-10-CM

## 2018-05-09 RX ORDER — LEVOTHYROXINE SODIUM 112 UG/1
112 TABLET ORAL DAILY
Qty: 30 TABLET | Refills: 1 | Status: SHIPPED | OUTPATIENT
Start: 2018-05-09 | End: 2018-06-26 | Stop reason: SDUPTHER

## 2018-05-09 NOTE — PROGRESS NOTES
Please tell patient CBC is in acceptable range except a slightly low white blood cell count.  Kidney and liver function are normal.  Cholesterol panel reveals a slightly elevated LDL or bad cholesterol, please ask her to work on a healthy diet with avoidance of fast food and fatty food.  Thyroid function tests reveal that we need to reduce her current dose of thyroid medication to 112 µg daily.  Please call in levothyroxin 112 µg #30 tablets with one refill.  Order TSH and free T4 to be done in 6 weeks.  Please tell patient to moustapha this on her calendar and come in at that time for lab draw only.  Please tell her we cannot refill medication until we check labs.

## 2018-06-25 LAB
T4 FREE SERPL-MCNC: 1.97 NG/DL (ref 0.78–2.19)
TSH SERPL DL<=0.005 MIU/L-ACNC: 1.53 MIU/ML (ref 0.47–4.68)

## 2018-06-26 DIAGNOSIS — E07.9 THYROID DISORDER: ICD-10-CM

## 2018-06-26 DIAGNOSIS — E07.9 DISORDER OF THYROID: ICD-10-CM

## 2018-06-26 RX ORDER — LEVOTHYROXINE SODIUM 112 UG/1
112 TABLET ORAL DAILY
Qty: 90 TABLET | Refills: 3 | Status: SHIPPED | OUTPATIENT
Start: 2018-06-26 | End: 2019-04-25 | Stop reason: SDUPTHER

## 2018-07-05 RX ORDER — PANTOPRAZOLE SODIUM 40 MG/1
TABLET, DELAYED RELEASE ORAL
Qty: 90 TABLET | Refills: 2 | Status: SHIPPED | OUTPATIENT
Start: 2018-07-05 | End: 2019-04-25 | Stop reason: SDUPTHER

## 2018-07-12 DIAGNOSIS — G47.09 OTHER INSOMNIA: ICD-10-CM

## 2018-07-12 RX ORDER — ZOLPIDEM TARTRATE 10 MG/1
TABLET ORAL
Qty: 30 TABLET | Refills: 2 | Status: SHIPPED | OUTPATIENT
Start: 2018-07-12 | End: 2018-10-15 | Stop reason: SDUPTHER

## 2018-07-17 ENCOUNTER — TELEPHONE (OUTPATIENT)
Dept: INTERNAL MEDICINE | Facility: CLINIC | Age: 68
End: 2018-07-17

## 2018-07-17 NOTE — TELEPHONE ENCOUNTER
PT/FAMILY IS WILLING TO GO TO [place] IF IT WILL GET THEM A QUICKER APPT WITH DR. HUTCHISON.     THANK YOU.

## 2018-07-18 NOTE — TELEPHONE ENCOUNTER
Spoke with Pt in regards to this. Pt stated she had no idea what I was talking about. Advised Pt I didn't see anything in her chart where she would need to see a neurosurgeon so I didn't think she called about this.

## 2018-08-08 RX ORDER — MONTELUKAST SODIUM 10 MG/1
10 TABLET ORAL EVERY EVENING
Qty: 30 TABLET | Refills: 5 | Status: SHIPPED | OUTPATIENT
Start: 2018-08-08 | End: 2019-02-18 | Stop reason: SDUPTHER

## 2018-10-15 DIAGNOSIS — G47.09 OTHER INSOMNIA: ICD-10-CM

## 2018-10-16 RX ORDER — ZOLPIDEM TARTRATE 10 MG/1
10 TABLET ORAL NIGHTLY PRN
Qty: 30 TABLET | Refills: 2 | Status: SHIPPED | OUTPATIENT
Start: 2018-10-16 | End: 2019-01-13 | Stop reason: SDUPTHER

## 2018-10-25 RX ORDER — DESOXIMETASONE 0.5 MG/G
CREAM TOPICAL DAILY
Qty: 60 G | Refills: 0 | Status: SHIPPED | OUTPATIENT
Start: 2018-10-25 | End: 2019-11-12 | Stop reason: SDUPTHER

## 2018-10-31 ENCOUNTER — OFFICE VISIT (OUTPATIENT)
Dept: INTERNAL MEDICINE | Facility: CLINIC | Age: 68
End: 2018-10-31

## 2018-10-31 VITALS
TEMPERATURE: 97.1 F | RESPIRATION RATE: 16 BRPM | WEIGHT: 154 LBS | OXYGEN SATURATION: 98 % | BODY MASS INDEX: 28.34 KG/M2 | HEIGHT: 62 IN | SYSTOLIC BLOOD PRESSURE: 118 MMHG | HEART RATE: 73 BPM | DIASTOLIC BLOOD PRESSURE: 74 MMHG

## 2018-10-31 DIAGNOSIS — R53.82 CHRONIC FATIGUE: ICD-10-CM

## 2018-10-31 DIAGNOSIS — Z23 NEED FOR INFLUENZA VACCINATION: ICD-10-CM

## 2018-10-31 DIAGNOSIS — E07.9 THYROID DISORDER: Primary | ICD-10-CM

## 2018-10-31 LAB
BASOPHILS # BLD AUTO: 0.05 10*3/MM3 (ref 0–0.2)
BASOPHILS NFR BLD AUTO: 1.1 % (ref 0–2.5)
EOSINOPHIL # BLD AUTO: 0.25 10*3/MM3 (ref 0–0.7)
EOSINOPHIL NFR BLD AUTO: 5.6 % (ref 0–7)
ERYTHROCYTE [DISTWIDTH] IN BLOOD BY AUTOMATED COUNT: 12.4 % (ref 11.5–14.5)
HCT VFR BLD AUTO: 45.8 % (ref 37–47)
HGB BLD-MCNC: 14.9 G/DL (ref 12–16)
IMM GRANULOCYTES # BLD: 0 10*3/MM3 (ref 0–0.06)
IMM GRANULOCYTES NFR BLD: 0 % (ref 0–0.6)
LYMPHOCYTES # BLD AUTO: 1.49 10*3/MM3 (ref 0.6–3.4)
LYMPHOCYTES NFR BLD AUTO: 33.6 % (ref 10–50)
MCH RBC QN AUTO: 30 PG (ref 27–31)
MCHC RBC AUTO-ENTMCNC: 32.5 G/DL (ref 30–37)
MCV RBC AUTO: 92.3 FL (ref 81–99)
MONOCYTES # BLD AUTO: 0.53 10*3/MM3 (ref 0–0.9)
MONOCYTES NFR BLD AUTO: 12 % (ref 0–12)
NEUTROPHILS # BLD AUTO: 2.11 10*3/MM3 (ref 2–6.9)
NEUTROPHILS NFR BLD AUTO: 47.7 % (ref 37–80)
NRBC BLD AUTO-RTO: 0 /100 WBC (ref 0–0)
PLATELET # BLD AUTO: 176 10*3/MM3 (ref 130–400)
RBC # BLD AUTO: 4.96 10*6/MM3 (ref 4.2–5.4)
T4 FREE SERPL-MCNC: 2.14 NG/DL (ref 0.78–2.19)
TSH SERPL DL<=0.005 MIU/L-ACNC: 1.94 MIU/ML (ref 0.47–4.68)
VIT B12 SERPL-MCNC: 278 PG/ML (ref 239–931)
WBC # BLD AUTO: 4.43 10*3/MM3 (ref 4.8–10.8)

## 2018-10-31 PROCEDURE — 90662 IIV NO PRSV INCREASED AG IM: CPT | Performed by: INTERNAL MEDICINE

## 2018-10-31 PROCEDURE — 99214 OFFICE O/P EST MOD 30 MIN: CPT | Performed by: INTERNAL MEDICINE

## 2018-10-31 PROCEDURE — G0008 ADMIN INFLUENZA VIRUS VAC: HCPCS | Performed by: INTERNAL MEDICINE

## 2018-10-31 NOTE — PROGRESS NOTES
"Chief Complaint   Patient presents with   • Fatigue     pt states around 4PM every evening she gets exhausted. Pt is unsure if this is r/t her thyroid or age.      Subjective   Dang Butler is a 68 y.o. female.     Patient is here with complaints of fatigue every afternoon.  We have had to adjust her thyroid medication approximately every 6 months over the last year and a half.  Her mother had hypothyroid and her medication had to slowly be increased with age also.   No recent hair loss, depression, voice change, trouble swallowing, heat sensitivity. Weight has been stable.    She does have constipation, some cold sensitivity.   Takes her thyroid med in middle of night with just water.   She is having some tingling and numbness in right hand only.  She believes this is from her shoulder.      Her recent pap was abnormal.  She has endometrial thickening and may need another D&C.           The following portions of the patient's history were reviewed and updated as appropriate: allergies, current medications, past family history, past medical history, past social history, past surgical history and problem list.    Review of Systems   Constitutional: Negative for activity change, appetite change and unexpected weight change.   HENT: Negative for trouble swallowing and voice change.    Respiratory: Negative for shortness of breath.    Cardiovascular: Negative for chest pain, palpitations and leg swelling.   Gastrointestinal: Negative for abdominal pain, constipation and diarrhea.   Endocrine: Positive for cold intolerance. Negative for heat intolerance.   Neurological: Positive for numbness. Negative for headaches.   Psychiatric/Behavioral: Negative for dysphoric mood.   All other systems reviewed and are negative.      Objective   /74   Pulse 73   Temp 97.1 °F (36.2 °C)   Resp 16   Ht 157.5 cm (62\")   Wt 69.9 kg (154 lb)   SpO2 98%   BMI 28.17 kg/m²   Body mass index is 28.17 kg/m².  Physical Exam "   Constitutional: She is oriented to person, place, and time. She appears well-developed and well-nourished.   Very pleasant female in no apparent distress   HENT:   Head: Normocephalic and atraumatic.   Mouth/Throat: Oropharynx is clear and moist.   Eyes: Pupils are equal, round, and reactive to light. Conjunctivae and EOM are normal.   Neck: Normal range of motion. Neck supple. No thyromegaly present.   Cardiovascular: Normal rate, regular rhythm, normal heart sounds and intact distal pulses.    No murmur heard.  Pulmonary/Chest: Effort normal and breath sounds normal. No respiratory distress. She has no wheezes.   Abdominal: Soft. Bowel sounds are normal. She exhibits no distension. There is no tenderness.   Musculoskeletal: Normal range of motion.   Lymphadenopathy:     She has no cervical adenopathy.   Neurological: She is alert and oriented to person, place, and time. No cranial nerve deficit. Coordination normal.   Psychiatric: She has a normal mood and affect. Her behavior is normal. Judgment and thought content normal.   Nursing note and vitals reviewed.      Assessment/Plan   Dang Butler is here today and the following problems have been addressed:      Dang was seen today for fatigue.    Diagnoses and all orders for this visit:    Thyroid disorder  -     TSH  -     T4, Free    Chronic fatigue  -     TSH  -     T4, Free  -     Vitamin B12  -     CBC & Differential    Need for influenza vaccination  -     Fluzone High Dose =>65Years    Labs as noted to evaluate fatigue  Will adjust thyroid medication if necessary  Flu shot given today  She will discuss her abnormal Pap with gynecologist, she will consider D&C versus hysterectomy    Return to clinic as scheduled or when necessary    Please note that portions of this note were completed with a voice recognition program.  Efforts were made to edit dictation, but occasionally words are mistranscribed.

## 2018-11-05 ENCOUNTER — CLINICAL SUPPORT (OUTPATIENT)
Dept: INTERNAL MEDICINE | Facility: CLINIC | Age: 68
End: 2018-11-05

## 2018-11-05 DIAGNOSIS — E53.8 VITAMIN B 12 DEFICIENCY: Primary | ICD-10-CM

## 2018-11-05 PROCEDURE — 96372 THER/PROPH/DIAG INJ SC/IM: CPT | Performed by: INTERNAL MEDICINE

## 2018-11-05 RX ORDER — CYANOCOBALAMIN 1000 UG/ML
1000 INJECTION, SOLUTION INTRAMUSCULAR; SUBCUTANEOUS
Status: SHIPPED | OUTPATIENT
Start: 2018-11-05 | End: 2018-12-03

## 2018-11-05 RX ORDER — CYANOCOBALAMIN 1000 UG/ML
1000 INJECTION, SOLUTION INTRAMUSCULAR; SUBCUTANEOUS
Status: SHIPPED | OUTPATIENT
Start: 2018-12-03

## 2018-11-05 RX ADMIN — CYANOCOBALAMIN 1000 MCG: 1000 INJECTION, SOLUTION INTRAMUSCULAR; SUBCUTANEOUS at 12:10

## 2018-11-12 ENCOUNTER — CLINICAL SUPPORT (OUTPATIENT)
Dept: INTERNAL MEDICINE | Facility: CLINIC | Age: 68
End: 2018-11-12

## 2018-11-12 DIAGNOSIS — E53.8 B12 DEFICIENCY: ICD-10-CM

## 2018-11-12 PROCEDURE — 96372 THER/PROPH/DIAG INJ SC/IM: CPT | Performed by: INTERNAL MEDICINE

## 2018-11-12 RX ADMIN — CYANOCOBALAMIN 1000 MCG: 1000 INJECTION, SOLUTION INTRAMUSCULAR; SUBCUTANEOUS at 13:23

## 2018-11-26 ENCOUNTER — CLINICAL SUPPORT (OUTPATIENT)
Dept: INTERNAL MEDICINE | Facility: CLINIC | Age: 68
End: 2018-11-26

## 2018-11-26 PROCEDURE — 96372 THER/PROPH/DIAG INJ SC/IM: CPT | Performed by: INTERNAL MEDICINE

## 2018-11-26 RX ADMIN — CYANOCOBALAMIN 1000 MCG: 1000 INJECTION, SOLUTION INTRAMUSCULAR; SUBCUTANEOUS at 13:50

## 2018-12-26 ENCOUNTER — CLINICAL SUPPORT (OUTPATIENT)
Dept: INTERNAL MEDICINE | Facility: CLINIC | Age: 68
End: 2018-12-26

## 2018-12-26 DIAGNOSIS — E53.8 B12 DEFICIENCY: ICD-10-CM

## 2018-12-26 PROCEDURE — 96372 THER/PROPH/DIAG INJ SC/IM: CPT | Performed by: INTERNAL MEDICINE

## 2018-12-26 RX ADMIN — CYANOCOBALAMIN 1000 MCG: 1000 INJECTION, SOLUTION INTRAMUSCULAR; SUBCUTANEOUS at 12:44

## 2019-01-13 DIAGNOSIS — G47.09 OTHER INSOMNIA: ICD-10-CM

## 2019-01-14 RX ORDER — ZOLPIDEM TARTRATE 10 MG/1
TABLET ORAL
Qty: 30 TABLET | Refills: 2 | Status: SHIPPED | OUTPATIENT
Start: 2019-01-14 | End: 2019-04-13 | Stop reason: SDUPTHER

## 2019-01-28 ENCOUNTER — CLINICAL SUPPORT (OUTPATIENT)
Dept: INTERNAL MEDICINE | Facility: CLINIC | Age: 69
End: 2019-01-28

## 2019-01-28 DIAGNOSIS — E53.8 B12 DEFICIENCY: ICD-10-CM

## 2019-01-28 PROCEDURE — 96372 THER/PROPH/DIAG INJ SC/IM: CPT | Performed by: INTERNAL MEDICINE

## 2019-01-28 RX ADMIN — CYANOCOBALAMIN 1000 MCG: 1000 INJECTION, SOLUTION INTRAMUSCULAR; SUBCUTANEOUS at 10:39

## 2019-02-06 ENCOUNTER — PRIOR AUTHORIZATION (OUTPATIENT)
Dept: INTERNAL MEDICINE | Facility: CLINIC | Age: 69
End: 2019-02-06

## 2019-02-18 RX ORDER — MONTELUKAST SODIUM 10 MG/1
10 TABLET ORAL EVERY EVENING
Qty: 30 TABLET | Refills: 5 | Status: SHIPPED | OUTPATIENT
Start: 2019-02-18 | End: 2019-04-25 | Stop reason: SDUPTHER

## 2019-02-25 ENCOUNTER — CLINICAL SUPPORT (OUTPATIENT)
Dept: INTERNAL MEDICINE | Facility: CLINIC | Age: 69
End: 2019-02-25

## 2019-02-25 DIAGNOSIS — E53.8 B12 DEFICIENCY: ICD-10-CM

## 2019-02-25 PROCEDURE — 96372 THER/PROPH/DIAG INJ SC/IM: CPT | Performed by: INTERNAL MEDICINE

## 2019-02-25 RX ADMIN — CYANOCOBALAMIN 1000 MCG: 1000 INJECTION, SOLUTION INTRAMUSCULAR; SUBCUTANEOUS at 11:11

## 2019-02-26 ENCOUNTER — TELEPHONE (OUTPATIENT)
Dept: SURGERY | Facility: CLINIC | Age: 69
End: 2019-02-26

## 2019-02-26 NOTE — TELEPHONE ENCOUNTER
I called patient to schedule a recall EGD She stated she had no problems at this time and felt she did not need to be seen.  She would call to schedule if needed

## 2019-03-28 ENCOUNTER — CLINICAL SUPPORT (OUTPATIENT)
Dept: INTERNAL MEDICINE | Facility: CLINIC | Age: 69
End: 2019-03-28

## 2019-03-28 DIAGNOSIS — E53.8 B12 DEFICIENCY: ICD-10-CM

## 2019-03-28 PROCEDURE — 96372 THER/PROPH/DIAG INJ SC/IM: CPT | Performed by: INTERNAL MEDICINE

## 2019-03-28 RX ADMIN — CYANOCOBALAMIN 1000 MCG: 1000 INJECTION, SOLUTION INTRAMUSCULAR; SUBCUTANEOUS at 10:35

## 2019-04-13 DIAGNOSIS — G47.09 OTHER INSOMNIA: ICD-10-CM

## 2019-04-15 RX ORDER — ZOLPIDEM TARTRATE 5 MG/1
5 TABLET ORAL NIGHTLY PRN
Qty: 30 TABLET | Refills: 1 | Status: SHIPPED | OUTPATIENT
Start: 2019-04-15 | End: 2019-05-14 | Stop reason: SDUPTHER

## 2019-04-25 ENCOUNTER — OFFICE VISIT (OUTPATIENT)
Dept: INTERNAL MEDICINE | Facility: CLINIC | Age: 69
End: 2019-04-25

## 2019-04-25 VITALS
HEIGHT: 62 IN | OXYGEN SATURATION: 97 % | BODY MASS INDEX: 28.25 KG/M2 | DIASTOLIC BLOOD PRESSURE: 80 MMHG | SYSTOLIC BLOOD PRESSURE: 122 MMHG | TEMPERATURE: 97.4 F | HEART RATE: 80 BPM | WEIGHT: 153.5 LBS

## 2019-04-25 DIAGNOSIS — K21.9 GASTROESOPHAGEAL REFLUX DISEASE WITHOUT ESOPHAGITIS: ICD-10-CM

## 2019-04-25 DIAGNOSIS — E07.9 THYROID DISORDER: ICD-10-CM

## 2019-04-25 DIAGNOSIS — F51.01 PRIMARY INSOMNIA: ICD-10-CM

## 2019-04-25 DIAGNOSIS — Z00.00 ENCOUNTER FOR MEDICARE ANNUAL WELLNESS EXAM: Primary | ICD-10-CM

## 2019-04-25 DIAGNOSIS — E55.9 VITAMIN D DEFICIENCY: ICD-10-CM

## 2019-04-25 DIAGNOSIS — E07.9 DISORDER OF THYROID: ICD-10-CM

## 2019-04-25 DIAGNOSIS — R53.82 CHRONIC FATIGUE: ICD-10-CM

## 2019-04-25 DIAGNOSIS — E78.2 MIXED HYPERLIPIDEMIA: ICD-10-CM

## 2019-04-25 LAB
25(OH)D3+25(OH)D2 SERPL-MCNC: 42.1 NG/ML
ALBUMIN SERPL-MCNC: 4.2 G/DL (ref 3.5–5)
ALBUMIN/GLOB SERPL: 1.4 G/DL (ref 1–2)
ALP SERPL-CCNC: 96 U/L (ref 38–126)
ALT SERPL-CCNC: 28 U/L (ref 13–69)
AST SERPL-CCNC: 28 U/L (ref 15–46)
BASOPHILS # BLD AUTO: 0.05 10*3/MM3 (ref 0–0.2)
BASOPHILS NFR BLD AUTO: 0.7 % (ref 0–1.5)
BILIRUB SERPL-MCNC: 0.6 MG/DL (ref 0.2–1.3)
BUN SERPL-MCNC: 19 MG/DL (ref 7–20)
BUN/CREAT SERPL: 27.1 (ref 7.1–23.5)
CALCIUM SERPL-MCNC: 10 MG/DL (ref 8.4–10.2)
CHLORIDE SERPL-SCNC: 103 MMOL/L (ref 98–107)
CHOLEST SERPL-MCNC: 217 MG/DL (ref 0–199)
CHOLEST/HDLC SERPL: 4.09 {RATIO}
CO2 SERPL-SCNC: 26 MMOL/L (ref 26–30)
CREAT SERPL-MCNC: 0.7 MG/DL (ref 0.6–1.3)
EOSINOPHIL # BLD AUTO: 0.61 10*3/MM3 (ref 0–0.4)
EOSINOPHIL NFR BLD AUTO: 8.8 % (ref 0.3–6.2)
ERYTHROCYTE [DISTWIDTH] IN BLOOD BY AUTOMATED COUNT: 12.5 % (ref 12.3–15.4)
GLOBULIN SER CALC-MCNC: 3.1 GM/DL
GLUCOSE SERPL-MCNC: 88 MG/DL (ref 74–98)
HCT VFR BLD AUTO: 45.9 % (ref 34–46.6)
HDLC SERPL-MCNC: 53 MG/DL (ref 40–60)
HGB BLD-MCNC: 15.5 G/DL (ref 12–15.9)
IMM GRANULOCYTES # BLD AUTO: 0.01 10*3/MM3 (ref 0–0.05)
IMM GRANULOCYTES NFR BLD AUTO: 0.1 % (ref 0–0.5)
LDLC SERPL CALC-MCNC: 139 MG/DL (ref 0–99)
LYMPHOCYTES # BLD AUTO: 1.68 10*3/MM3 (ref 0.7–3.1)
LYMPHOCYTES NFR BLD AUTO: 24.3 % (ref 19.6–45.3)
MCH RBC QN AUTO: 31.1 PG (ref 26.6–33)
MCHC RBC AUTO-ENTMCNC: 33.8 G/DL (ref 31.5–35.7)
MCV RBC AUTO: 92 FL (ref 79–97)
MONOCYTES # BLD AUTO: 0.65 10*3/MM3 (ref 0.1–0.9)
MONOCYTES NFR BLD AUTO: 9.4 % (ref 5–12)
NEUTROPHILS # BLD AUTO: 3.92 10*3/MM3 (ref 1.7–7)
NEUTROPHILS NFR BLD AUTO: 56.7 % (ref 42.7–76)
NRBC BLD AUTO-RTO: 0 /100 WBC (ref 0–0.2)
PLATELET # BLD AUTO: 142 10*3/MM3 (ref 140–450)
POTASSIUM SERPL-SCNC: 4.7 MMOL/L (ref 3.5–5.1)
PROT SERPL-MCNC: 7.3 G/DL (ref 6.3–8.2)
RBC # BLD AUTO: 4.99 10*6/MM3 (ref 3.77–5.28)
SODIUM SERPL-SCNC: 138 MMOL/L (ref 137–145)
T4 FREE SERPL-MCNC: 1.86 NG/DL (ref 0.78–2.19)
TRIGL SERPL-MCNC: 124 MG/DL
TSH SERPL DL<=0.005 MIU/L-ACNC: 1.36 MIU/ML (ref 0.47–4.68)
VLDLC SERPL CALC-MCNC: 24.8 MG/DL
WBC # BLD AUTO: 6.92 10*3/MM3 (ref 3.4–10.8)

## 2019-04-25 PROCEDURE — G0439 PPPS, SUBSEQ VISIT: HCPCS | Performed by: INTERNAL MEDICINE

## 2019-04-25 PROCEDURE — 96160 PT-FOCUSED HLTH RISK ASSMT: CPT | Performed by: INTERNAL MEDICINE

## 2019-04-25 PROCEDURE — 99397 PER PM REEVAL EST PAT 65+ YR: CPT | Performed by: INTERNAL MEDICINE

## 2019-04-25 RX ORDER — PANTOPRAZOLE SODIUM 40 MG/1
40 TABLET, DELAYED RELEASE ORAL DAILY
Qty: 90 TABLET | Refills: 3 | Status: SHIPPED | OUTPATIENT
Start: 2019-04-25 | End: 2019-05-14 | Stop reason: SDUPTHER

## 2019-04-25 RX ORDER — LEVOTHYROXINE SODIUM 112 UG/1
112 TABLET ORAL DAILY
Qty: 90 TABLET | Refills: 3 | Status: SHIPPED | OUTPATIENT
Start: 2019-04-25 | End: 2019-06-26 | Stop reason: SDUPTHER

## 2019-04-25 RX ORDER — MONTELUKAST SODIUM 10 MG/1
10 TABLET ORAL EVERY EVENING
Qty: 90 TABLET | Refills: 3 | Status: SHIPPED | OUTPATIENT
Start: 2019-04-25 | End: 2020-04-21

## 2019-04-25 RX ORDER — RANITIDINE 150 MG/1
150 TABLET ORAL 2 TIMES DAILY
Qty: 180 TABLET | Refills: 3 | Status: SHIPPED | OUTPATIENT
Start: 2019-04-25 | End: 2019-11-12

## 2019-04-25 RX ORDER — MELOXICAM 15 MG/1
15 TABLET ORAL DAILY PRN
Qty: 90 TABLET | Refills: 3 | Status: SHIPPED | OUTPATIENT
Start: 2019-04-25 | End: 2019-06-14 | Stop reason: SDUPTHER

## 2019-04-25 NOTE — PROGRESS NOTES
QUICK REFERENCE INFORMATION:  The ABCs of the Annual Wellness Visit    Subsequent Medicare Wellness Visit     HEALTH RISK ASSESSMENT    : 1950    Recent Hospitalizations:  No hospitalization(s) within the last year..  ccc      Current Medical Providers:  Patient Care Team:  Vermeesch, Marilyn K, MD as PCP - General        Smoking Status:  Social History     Tobacco Use   Smoking Status Never Smoker       Alcohol Consumption:  Social History     Substance and Sexual Activity   Alcohol Use Not on file       Depression Screen:   PHQ-2/PHQ-9 Depression Screening 2019   Little interest or pleasure in doing things 0   Feeling down, depressed, or hopeless 0   Total Score 0       Health Habits and Functional and Cognitive Screening:  Functional & Cognitive Status 2019   Do you have difficulty preparing food and eating? No   Do you have difficulty bathing yourself, getting dressed or grooming yourself? No   Do you have difficulty using the toilet? No   Do you have difficulty moving around from place to place? No   Do you have trouble with steps or getting out of a bed or a chair? No   In the past year have you fallen or experienced a near fall? No   Current Diet Limited Junk Food   Dental Exam Up to date   Eye Exam Up to date   Exercise (times per week) 0 times per week   Current Exercise Activities Include None   Do you need help using the phone?  No   Are you deaf or do you have serious difficulty hearing?  No   Do you need help with transportation? No   Do you need help shopping? No   Do you need help preparing meals?  No   Do you need help with housework?  No   Do you need help with laundry? No   Do you need help taking your medications? No   Do you need help managing money? No   Do you ever drive or ride in a car without wearing a seat belt? No   Have you felt unusual stress, anger or loneliness in the last month? No   Who do you live with? Spouse   If you need help, do you have trouble finding someone  available to you? No   Do you have difficulty concentrating, remembering or making decisions? No           Does the patient have evidence of cognitive impairment? No    Asiprin use counseling: Does not need ASA (and currently is not on it)      Recent Lab Results:    Lab Results   Component Value Date    GLU 89 05/08/2018        Lab Results   Component Value Date    CHOL 196 10/31/2016    TRIG 106 05/08/2018    HDL 52 05/08/2018    VLDL 21.2 05/08/2018           Age-appropriate Screening Schedule:  Refer to the list below for future screening recommendations based on patient's age, sex and/or medical conditions. Orders for these recommended tests are listed in the plan section. The patient has been provided with a written plan.    Health Maintenance   Topic Date Due   • ZOSTER VACCINE (2 of 2) 02/25/2011   • COLONOSCOPY  04/29/2016   • MAMMOGRAM  03/21/2018   • LIPID PANEL  05/08/2019   • INFLUENZA VACCINE  08/01/2019   • PNEUMOCOCCAL VACCINES (65+ LOW/MEDIUM RISK)  Completed   • TDAP/TD VACCINES  Discontinued        Subjective   History of Present Illness    Dang Butler is a 68 y.o. female who presents for an Annual Wellness Visit.   PMH of HLD, allergies, asthma, vit D def, GERD, hypothyroid, DJD, insomnia.  She is having some right knee pain for over a yr, she is seeing an orthopedic doctor.  She was given mobic and uses only as needed.  Her knee pain is unchanged, she may need a TKR in the future.  She is following a gluten free diet.  Also has to avoid nuts/lettuce/tomatoes due to stomach issues.  She saw GI last yr and was told to follow the FODMAP diet.  Was also told to take mag oxide or miralax for constipation. No GERD sxs with use of protonix.  She takes vit D 2000 u winter and 1000 u summer.  She is taking OTC allergy/congestion med daily.  She takes thyroid med daily on empty stomach.  She is using her ambien every night for sleep.     The following portions of the patient's history were reviewed and  updated as appropriate: allergies, current medications, past family history, past medical history, past social history, past surgical history and problem list.    Outpatient Medications Prior to Visit   Medication Sig Dispense Refill   • Estradiol (IMVEXXY MAINTENANCE PACK) 4 MCG insert      • desoximetasone (TOPICORT) 0.05 % cream APPLY TOPICALLY DAILY 60 g 0   • fluocinonide (LIDEX) 0.05 % cream Apply  topically 2 (two) times a day. Apply sparingly to affected area(s) twice daily     • Magnesium 400 MG tablet      • triamcinolone (KENALOG) 0.1 % cream Apply  topically 2 (two) times a day. 60 g 1   • zolpidem (AMBIEN) 5 MG tablet Take 1 tablet by mouth At Night As Needed for Sleep. PATIENT MUST HAVE FOLLOW UP APPT FOR FURTHER REFILLS. 30 tablet 1   • ESTRACE VAGINAL 0.1 MG/GM vaginal cream Insert 2 g into the vagina 3 (Three) Times a Week. 45 g 2   • levothyroxine (SYNTHROID, LEVOTHROID) 112 MCG tablet Take 1 tablet by mouth Daily. As directed 90 tablet 3   • meloxicam (MOBIC) 15 MG tablet Take 1 tablet by mouth Daily As Needed for Mild Pain . 30 tablet 3   • montelukast (SINGULAIR) 10 MG tablet Take 1 tablet by mouth Every Evening. 30 tablet 5   • pantoprazole (PROTONIX) 40 MG EC tablet TAKE ONE TABLET BY MOUTH DAILY 90 tablet 2   • raNITIdine (ZANTAC) 150 MG tablet Take 1 tablet by mouth 2 (Two) Times a Day. For reflux 180 tablet 3     Facility-Administered Medications Prior to Visit   Medication Dose Route Frequency Provider Last Rate Last Dose   • cyanocobalamin injection 1,000 mcg  1,000 mcg Intramuscular Q28 Days Vermeesch, Marilyn K, MD   1,000 mcg at 03/28/19 1035       Patient Active Problem List   Diagnosis   • Atopic rhinitis   • Asthma   • Gastroesophageal reflux disease   • Generalized osteoarthritis   • Hyperlipidemia   • Insomnia   • Vitamin D deficiency   • Menopausal and postmenopausal disorder   • Atopic dermatitis   • Thyroid disorder   • Chronic fatigue   • Encounter for Medicare annual wellness  exam       Advance Care Planning:  Patient does not have an advance directive - information provided to the patient today    Identification of Risk Factors:  Risk factors include: weight , cardiovascular risk and inactivity.    Review of Systems   Constitutional: Positive for fatigue.   HENT: Positive for postnasal drip.    Eyes: Negative.    Respiratory: Negative.    Cardiovascular: Negative.    Gastrointestinal: Positive for constipation.   Endocrine: Negative.    Genitourinary: Negative.    Musculoskeletal: Positive for arthralgias.   Skin: Negative.    Allergic/Immunologic: Positive for environmental allergies.   Neurological: Negative.    Hematological: Negative.    Psychiatric/Behavioral: Positive for sleep disturbance.       Compared to one year ago, the patient feels her physical health is the same.  Compared to one year ago, the patient feels her mental health is the same.    Objective     Physical Exam   Constitutional: She is oriented to person, place, and time. She appears well-developed and well-nourished. No distress.   Very pleasant female who appears her stated age, she is in no distress today   HENT:   Head: Normocephalic and atraumatic.   Right Ear: External ear normal.   Left Ear: External ear normal.   Mouth/Throat: Oropharynx is clear and moist.   Eyes: Conjunctivae and EOM are normal. Pupils are equal, round, and reactive to light.   Neck: Normal range of motion. Neck supple. No thyromegaly present.   Cardiovascular: Normal rate, regular rhythm, normal heart sounds and intact distal pulses.   No murmur heard.  No carotid bruits   Pulmonary/Chest: Effort normal and breath sounds normal. No respiratory distress. She has no wheezes.   Abdominal: Soft. Bowel sounds are normal. She exhibits no distension. There is no tenderness.   Musculoskeletal: Normal range of motion. She exhibits no edema.   Knees with significant crepitus bilaterally   Lymphadenopathy:     She has no cervical adenopathy.  "  Neurological: She is alert and oriented to person, place, and time. No cranial nerve deficit. Coordination normal.   Skin:   Left lower back with 5 mm dark brown mole noted, mid back with few light tan AK lesions noted   Psychiatric: She has a normal mood and affect. Her behavior is normal. Judgment and thought content normal.   Nursing note and vitals reviewed.      Vitals:    04/25/19 1048   BP: 122/80   Pulse: 80   Temp: 97.4 °F (36.3 °C)   SpO2: 97%   Weight: 69.6 kg (153 lb 8 oz)   Height: 157.5 cm (62\")   PainSc: 0-No pain       Patient's Body mass index is 28.08 kg/m². BMI is above normal parameters. Recommendations include: exercise counseling and nutrition counseling.      Assessment/Plan   Patient Self-Management and Personalized Health Advice  The patient has been provided with information about: diet, exercise, weight management, the relationship between weight and GERD, designing advance directives and supplements and preventive services including:   · Advance directive, Diabetes screening, see lab orders, Exercise counseling provided, Nutrition counseling provided.    Visit Diagnoses:    ICD-10-CM ICD-9-CM   1. Encounter for Medicare annual wellness exam Z00.00 V70.0   2. Disorder of thyroid E07.9 246.9   3. Thyroid disorder E07.9 246.9   4. Mixed hyperlipidemia E78.2 272.2   5. Gastroesophageal reflux disease without esophagitis K21.9 530.81   6. Vitamin D deficiency E55.9 268.9   7. Primary insomnia F51.01 307.42   8. Chronic fatigue R53.82 780.79       Orders Placed This Encounter   Procedures   • Comprehensive Metabolic Panel   • Lipid Panel With / Chol / HDL Ratio   • TSH   • T4, Free   • Vitamin D 25 Hydroxy   • CBC & Differential     Order Specific Question:   Manual Differential     Answer:   No       Outpatient Encounter Medications as of 4/25/2019   Medication Sig Dispense Refill   • Estradiol (IMVEXXY MAINTENANCE PACK) 4 MCG insert      • desoximetasone (TOPICORT) 0.05 % cream APPLY " TOPICALLY DAILY 60 g 0   • fluocinonide (LIDEX) 0.05 % cream Apply  topically 2 (two) times a day. Apply sparingly to affected area(s) twice daily     • levothyroxine (SYNTHROID, LEVOTHROID) 112 MCG tablet Take 1 tablet by mouth Daily. As directed 90 tablet 3   • Magnesium 400 MG tablet      • meloxicam (MOBIC) 15 MG tablet Take 1 tablet by mouth Daily As Needed for Mild Pain . 90 tablet 3   • montelukast (SINGULAIR) 10 MG tablet Take 1 tablet by mouth Every Evening. 90 tablet 3   • pantoprazole (PROTONIX) 40 MG EC tablet Take 1 tablet by mouth Daily. 90 tablet 3   • raNITIdine (ZANTAC) 150 MG tablet Take 1 tablet by mouth 2 (Two) Times a Day. For reflux 180 tablet 3   • triamcinolone (KENALOG) 0.1 % cream Apply  topically 2 (two) times a day. 60 g 1   • zolpidem (AMBIEN) 5 MG tablet Take 1 tablet by mouth At Night As Needed for Sleep. PATIENT MUST HAVE FOLLOW UP APPT FOR FURTHER REFILLS. 30 tablet 1   • [DISCONTINUED] ESTRACE VAGINAL 0.1 MG/GM vaginal cream Insert 2 g into the vagina 3 (Three) Times a Week. 45 g 2   • [DISCONTINUED] levothyroxine (SYNTHROID, LEVOTHROID) 112 MCG tablet Take 1 tablet by mouth Daily. As directed 90 tablet 3   • [DISCONTINUED] meloxicam (MOBIC) 15 MG tablet Take 1 tablet by mouth Daily As Needed for Mild Pain . 30 tablet 3   • [DISCONTINUED] montelukast (SINGULAIR) 10 MG tablet Take 1 tablet by mouth Every Evening. 30 tablet 5   • [DISCONTINUED] pantoprazole (PROTONIX) 40 MG EC tablet TAKE ONE TABLET BY MOUTH DAILY 90 tablet 2   • [DISCONTINUED] raNITIdine (ZANTAC) 150 MG tablet Take 1 tablet by mouth 2 (Two) Times a Day. For reflux 180 tablet 3     Facility-Administered Encounter Medications as of 4/25/2019   Medication Dose Route Frequency Provider Last Rate Last Dose   • cyanocobalamin injection 1,000 mcg  1,000 mcg Intramuscular Q28 Days Vermeesch, Marilyn K, MD   1,000 mcg at 03/28/19 1035       Reviewed use of high risk medication in the elderly: yes  Reviewed for potential of  harmful drug interactions in the elderly: yes     Labs as noted  Recommend prevagen to help memory  Recommend turmeric for DJD pain  Advance directive info given  Use mobic as needed with food  Will do cologuard next yr per pt request, she declines colonoscopies  Patient remains on MiraLAX and magnesium oxide due to underlying constipation  Encouraged her to take only Protonix for reflux and use Zantac only as needed for her evening symptoms of GERD  Continue Ambien for sleep only as needed    Follow Up:  Return in about 6 months (around 10/25/2019) for Next scheduled follow up.     An After Visit Summary and PPPS with all of these plans were given to the patient.

## 2019-05-14 DIAGNOSIS — G47.09 OTHER INSOMNIA: ICD-10-CM

## 2019-05-15 NOTE — TELEPHONE ENCOUNTER
Please fill 90-day Protonix.  Please tell patient to fill her last 30 days of Ambien prior to us giving a 90-day supply of Ambien.

## 2019-05-17 RX ORDER — PANTOPRAZOLE SODIUM 40 MG/1
40 TABLET, DELAYED RELEASE ORAL DAILY
Qty: 90 TABLET | Refills: 3 | Status: SHIPPED | OUTPATIENT
Start: 2019-05-17 | End: 2019-11-12 | Stop reason: SDUPTHER

## 2019-05-17 RX ORDER — ZOLPIDEM TARTRATE 5 MG/1
5 TABLET ORAL NIGHTLY PRN
Qty: 30 TABLET | Refills: 1 | Status: SHIPPED | OUTPATIENT
Start: 2019-05-17 | End: 2019-06-14 | Stop reason: SDUPTHER

## 2019-06-14 DIAGNOSIS — G47.09 OTHER INSOMNIA: ICD-10-CM

## 2019-06-14 RX ORDER — ZOLPIDEM TARTRATE 5 MG/1
5 TABLET ORAL NIGHTLY PRN
Qty: 90 TABLET | Refills: 0 | Status: SHIPPED | OUTPATIENT
Start: 2019-06-14 | End: 2019-06-26 | Stop reason: SDUPTHER

## 2019-06-14 RX ORDER — MELOXICAM 15 MG/1
15 TABLET ORAL DAILY PRN
Qty: 90 TABLET | Refills: 3 | Status: SHIPPED | OUTPATIENT
Start: 2019-06-14 | End: 2019-11-12 | Stop reason: SDUPTHER

## 2019-06-17 ENCOUNTER — TELEPHONE (OUTPATIENT)
Dept: INTERNAL MEDICINE | Facility: CLINIC | Age: 69
End: 2019-06-17

## 2019-06-17 RX ORDER — DOXEPIN HYDROCHLORIDE 6 MG/1
6 TABLET ORAL DAILY
Qty: 30 TABLET | Refills: 3 | Status: SHIPPED | OUTPATIENT
Start: 2019-06-17 | End: 2019-11-12 | Stop reason: SDUPTHER

## 2019-06-17 NOTE — TELEPHONE ENCOUNTER
Pt states she hasn't been sleeping well the last few months. States she recently noticed her Ambien is 5 mg instead of the 10 mg. Pt is requesting a script for 10 mg.

## 2019-06-17 NOTE — TELEPHONE ENCOUNTER
I have discussed with her before that the maximum amount of Ambien recommended for females is 5 mg.  [place] and state police have discussed this with physicians recently and are cracking down on us.  I have sent in doxepin 6 mg to be taken 1 hour before bed in addition to her Ambien.  Please ask her to try this also.

## 2019-06-17 NOTE — TELEPHONE ENCOUNTER
Patient called and asked if you would give her a call when you get a chance, she did not want to disclose what it was concerning.  Phone number verified.  Thank you.

## 2019-06-26 DIAGNOSIS — E07.9 DISORDER OF THYROID: ICD-10-CM

## 2019-06-26 DIAGNOSIS — G47.09 OTHER INSOMNIA: ICD-10-CM

## 2019-06-26 DIAGNOSIS — E07.9 THYROID DISORDER: ICD-10-CM

## 2019-06-26 RX ORDER — LEVOTHYROXINE SODIUM 112 UG/1
112 TABLET ORAL DAILY
Qty: 90 TABLET | Refills: 1 | Status: SHIPPED | OUTPATIENT
Start: 2019-06-26 | End: 2019-11-12 | Stop reason: SDUPTHER

## 2019-06-26 RX ORDER — ZOLPIDEM TARTRATE 10 MG/1
TABLET ORAL
Qty: 135 TABLET | Refills: 0 | Status: SHIPPED | OUTPATIENT
Start: 2019-06-26 | End: 2019-11-12 | Stop reason: SDUPTHER

## 2019-09-03 ENCOUNTER — PATIENT MESSAGE (OUTPATIENT)
Dept: INTERNAL MEDICINE | Facility: CLINIC | Age: 69
End: 2019-09-03

## 2019-09-03 DIAGNOSIS — M79.18 CHRONIC MUSCULOSKELETAL PAIN: Primary | ICD-10-CM

## 2019-09-03 DIAGNOSIS — G89.29 CHRONIC MUSCULOSKELETAL PAIN: Primary | ICD-10-CM

## 2019-09-03 NOTE — TELEPHONE ENCOUNTER
From: Dang Butler  To: Vermeesch, Marilyn K, MD  Sent: 9/3/2019 1:03 PM EDT  Subject: Referral Request    I would like to see a rheumatologist by the name of Dr Elizondo at the [facility]. I will need a referral from you. telephone # [phone]. Thank you

## 2019-11-12 ENCOUNTER — OFFICE VISIT (OUTPATIENT)
Dept: INTERNAL MEDICINE | Facility: CLINIC | Age: 69
End: 2019-11-12

## 2019-11-12 VITALS
HEIGHT: 62 IN | HEART RATE: 83 BPM | WEIGHT: 154.8 LBS | DIASTOLIC BLOOD PRESSURE: 76 MMHG | TEMPERATURE: 97.6 F | SYSTOLIC BLOOD PRESSURE: 122 MMHG | BODY MASS INDEX: 28.49 KG/M2 | OXYGEN SATURATION: 99 %

## 2019-11-12 DIAGNOSIS — E53.8 VITAMIN B 12 DEFICIENCY: ICD-10-CM

## 2019-11-12 DIAGNOSIS — K21.9 GASTROESOPHAGEAL REFLUX DISEASE WITHOUT ESOPHAGITIS: ICD-10-CM

## 2019-11-12 DIAGNOSIS — G47.09 OTHER INSOMNIA: ICD-10-CM

## 2019-11-12 DIAGNOSIS — E78.2 MIXED HYPERLIPIDEMIA: Primary | ICD-10-CM

## 2019-11-12 DIAGNOSIS — E07.9 DISORDER OF THYROID: ICD-10-CM

## 2019-11-12 DIAGNOSIS — M15.9 GENERALIZED OSTEOARTHRITIS: ICD-10-CM

## 2019-11-12 DIAGNOSIS — E07.9 THYROID DISORDER: ICD-10-CM

## 2019-11-12 DIAGNOSIS — E55.9 VITAMIN D DEFICIENCY: ICD-10-CM

## 2019-11-12 PROCEDURE — 99214 OFFICE O/P EST MOD 30 MIN: CPT | Performed by: INTERNAL MEDICINE

## 2019-11-12 PROCEDURE — G0008 ADMIN INFLUENZA VIRUS VAC: HCPCS | Performed by: INTERNAL MEDICINE

## 2019-11-12 PROCEDURE — 90653 IIV ADJUVANT VACCINE IM: CPT | Performed by: INTERNAL MEDICINE

## 2019-11-12 RX ORDER — PANTOPRAZOLE SODIUM 40 MG/1
40 TABLET, DELAYED RELEASE ORAL DAILY
Qty: 90 TABLET | Refills: 3 | Status: SHIPPED | OUTPATIENT
Start: 2019-11-12 | End: 2020-12-13 | Stop reason: SDUPTHER

## 2019-11-12 RX ORDER — MELOXICAM 15 MG/1
15 TABLET ORAL DAILY PRN
Qty: 90 TABLET | Refills: 3 | Status: SHIPPED | OUTPATIENT
Start: 2019-11-12 | End: 2020-12-13 | Stop reason: SDUPTHER

## 2019-11-12 RX ORDER — ESTRADIOL 0.05 MG/D
0.5 FILM, EXTENDED RELEASE TRANSDERMAL
COMMUNITY
Start: 2019-10-04 | End: 2023-02-06

## 2019-11-12 RX ORDER — LEVOTHYROXINE SODIUM 112 UG/1
112 TABLET ORAL DAILY
Qty: 90 TABLET | Refills: 1 | Status: SHIPPED | OUTPATIENT
Start: 2019-11-12 | End: 2019-12-29 | Stop reason: SDUPTHER

## 2019-11-12 RX ORDER — TRIAMCINOLONE ACETONIDE 1 MG/G
CREAM TOPICAL 2 TIMES DAILY
Qty: 60 G | Refills: 1 | Status: SHIPPED | OUTPATIENT
Start: 2019-11-12 | End: 2020-12-13 | Stop reason: SDUPTHER

## 2019-11-12 RX ORDER — ZOLPIDEM TARTRATE 10 MG/1
TABLET ORAL
Qty: 90 TABLET | Refills: 0 | Status: SHIPPED | OUTPATIENT
Start: 2019-11-12 | End: 2020-02-27 | Stop reason: SDUPTHER

## 2019-11-12 RX ORDER — DOXEPIN HYDROCHLORIDE 6 MG/1
6 TABLET ORAL DAILY
Qty: 30 TABLET | Refills: 3 | Status: SHIPPED | OUTPATIENT
Start: 2019-11-12 | End: 2020-05-13

## 2019-11-12 RX ORDER — DESOXIMETASONE 0.5 MG/G
CREAM TOPICAL DAILY
Qty: 60 G | Refills: 0 | Status: SHIPPED | OUTPATIENT
Start: 2019-11-12 | End: 2020-12-14

## 2019-11-14 LAB — VIT B12 SERPL-MCNC: 482 PG/ML (ref 211–946)

## 2019-11-14 NOTE — PROGRESS NOTES
Please tell patient her vitamin B12 level is 482 with normal being between 210 and 950.  I would like her to take vitamin B12 1000 mcg on Monday, Wednesday and Friday only.

## 2019-12-12 ENCOUNTER — OFFICE VISIT (OUTPATIENT)
Dept: INTERNAL MEDICINE | Facility: CLINIC | Age: 69
End: 2019-12-12

## 2019-12-12 ENCOUNTER — HOSPITAL ENCOUNTER (OUTPATIENT)
Dept: GENERAL RADIOLOGY | Facility: HOSPITAL | Age: 69
Discharge: HOME | End: 2019-12-12
Admitting: INTERNAL MEDICINE

## 2019-12-12 VITALS
HEART RATE: 77 BPM | DIASTOLIC BLOOD PRESSURE: 72 MMHG | HEIGHT: 62 IN | WEIGHT: 155 LBS | SYSTOLIC BLOOD PRESSURE: 120 MMHG | BODY MASS INDEX: 28.52 KG/M2 | OXYGEN SATURATION: 97 % | TEMPERATURE: 97.7 F

## 2019-12-12 DIAGNOSIS — Z01.818 PRE-OP EXAM: Primary | ICD-10-CM

## 2019-12-12 DIAGNOSIS — Z01.818 PRE-OP EXAM: ICD-10-CM

## 2019-12-12 PROCEDURE — 71046 X-RAY EXAM CHEST 2 VIEWS: CPT

## 2019-12-12 PROCEDURE — 99214 OFFICE O/P EST MOD 30 MIN: CPT | Performed by: INTERNAL MEDICINE

## 2019-12-12 PROCEDURE — 93000 ELECTROCARDIOGRAM COMPLETE: CPT | Performed by: INTERNAL MEDICINE

## 2019-12-12 NOTE — PROGRESS NOTES
" Subjective:      Dang Butler is a 69 y.o. female who presents to the office today for a preoperative consultation at the request of surgeon Dr Red who plans on performing hysterectomy/BSO, bladder tack on January 15. This consultation is requested for the specific conditions prompting preoperative evaluation for preop clearance. Planned anesthesia is general. The patient has the following known anesthesia issues: pt has nausea with anesthesia . Patient has a bleeding risk of: no recent abnormal bleeding and no remote history of abnormal bleeding. Patient does not have objections to receiving blood products if needed.    The following portions of the patient's history were reviewed and updated as appropriate: allergies, current medications, past family history, past medical history, past social history, past surgical history and problem list.    Review of Systems  Genitourinary:positive for vaginal bleeding since starting HRT       Objective:      Physical Exam  /72   Pulse 77   Temp 97.7 °F (36.5 °C)   Ht 157.5 cm (62.01\")   Wt 70.3 kg (155 lb)   SpO2 97%   BMI 28.34 kg/m²     General Appearance:    Alert, cooperative, no distress, appears stated age   Head:    Normocephalic, without obvious abnormality, atraumatic   Eyes:    PERRL, conjunctiva/corneas clear, EOM's intact, fundi     benign, both eyes   Ears:    Normal TM's and external ear canals, both ears   Nose:   Nares normal, septum midline, mucosa normal, no drainage    or sinus tenderness   Throat:   Lips, mucosa, and tongue normal; teeth and gums normal   Neck:   Supple, symmetrical, trachea midline, no adenopathy;     thyroid:  no enlargement/tenderness/nodules; no carotid    bruit or JVD   Back:     Symmetric, no curvature, ROM normal, no CVA tenderness   Lungs:     Clear to auscultation bilaterally, respirations unlabored   Chest Wall:    No tenderness or deformity    Heart:    Regular rate and rhythm, S1 and S2 normal, no murmur, rub   or " gallop       Abdomen:     Soft, non-tender, bowel sounds active all four quadrants,     no masses, no organomegaly           Extremities:   Extremities normal, atraumatic, no cyanosis or edema   Pulses:   2+ and symmetric all extremities   Skin:   Skin color, texture, turgor normal, no rashes or lesions   Lymph nodes:   Cervical, supraclavicular, and axillary nodes normal   Neurologic:   CNII-XII intact, normal strength, sensation and reflexes     throughout       Predictors of intubation difficulty:  none    Cardiographics    ECG 12 Lead  Date/Time: 12/12/2019 12:35 PM  Performed by: Vermeesch, Marilyn K, MD  Authorized by: Vermeesch, Marilyn K, MD   Comparison: not compared with previous ECG   Rhythm: sinus rhythm  Rate: normal  Conduction: conduction normal  ST Segments: ST segments normal  T Waves: T waves normal  QRS axis: normal  Other: no other findings    Clinical impression: normal ECG              Imaging  See attached report    Lab Review   Labs done per surgeon    Assessment:      69 y.o. female with planned surgery as above.    Known risk factors for perioperative complications: None    Difficulty with intubation is not anticipated.    Cardiac Risk Estimation: per the Revised Cardiac Risk Index (Circ. 100:1043, 1999), the patient's risk factors for cardiac complications include none, putting her in: RCI RISK CLASS I (0 risk factors, risk of major cardiac compl. appr. 0.5%)    Current medications which may produce withdrawal symptoms if withheld perioperatively: ambien      Plan:      1. Preoperative workup as follows: CXR and EKG  2. Change in medication regimen before surgery: no ASA, NSAIDS or herbs for one week before surgery  3. Prophylaxis for cardiac events with perioperative beta-blockers: not indicated  4. Invasive hemodynamic monitoring perioperatively: per anesthesia  5. Deep vein thrombosis prophylaxis postoperatively:per surgeon  6. Surveillance for postoperative MI with ECG immediately  postoperatively and on postoperative days 1 and 2 AND troponin levels 24 hours postoperatively and on day 4 or hospital discharge (whichever comes first): as indicated

## 2019-12-19 NOTE — PROGRESS NOTES
Dang Butler is a 66 y.o. female.     Subjective   History of Present Illness   Here today with report of over 1 week of sinus pressure, headache, postnasal drip and cough. Denies fever or chills. Began taking an old prescription of Cefprozil 2 days ago and has started to feel better. Sputum green in color and very thick. Intermittent sore throat, rhinorrhea and otalgia. Taking Sudafed for nearly 10 days without improvement.     Jammed the right 5th finger about 6 weeks ago. Had pain, bruising and edema for a few days which resolved but now has a little tenderness in the distal joint and is unable to fully extend the finger at the distal joint.       The following portions of the patient's history were reviewed and updated as appropriate: allergies, current medications, past family history, past medical history, past social history, past surgical history and problem list.    Review of Systems    Constitutional: Negative for appetite change, chills, fatigue, fever and unexpected weight change.   HENT: postnasal drip, rhinorrhea, congestion, ear pain, sore throat.  Negative for hearing loss, nosebleeds, tinnitus and trouble swallowing.    Eyes: Negative for photophobia, discharge and visual disturbance.   Respiratory: Cough. Negative for chest tightness, shortness of breath and wheezing.    Cardiovascular: Negative for chest pain, palpitations and leg swelling.   Gastrointestinal: Negative for abdominal distention, abdominal pain, blood in stool, constipation, diarrhea, nausea and vomiting.   Endocrine: Negative for cold intolerance, heat intolerance, polydipsia, polyphagia and polyuria.   Musculoskeletal: Right 5th finger tenderness, unable to fully extend distal joint.  Negative for  back pain, joint swelling, myalgias, neck pain and neck stiffness.   Skin: Negative for color change, pallor, rash and wound.   Allergic/Immunologic: Negative for environmental allergies, food allergies and immunocompromised state.  Patient requesting refill of medication.  Medication has been loaded for review.  Please Fax to local pharmacy. Patient will check with pharmacy in 24 to 48 hours  Comments:        "  Neurological: HA. Negative for dizziness, tremors, seizures, weakness, numbness.   Hematological: Negative for adenopathy. Does not bruise/bleed easily.   Psychiatric/Behavioral: Negative for agitation, behavioral problems, confusion, hallucinations, self-injury and suicidal ideas. The patient is not nervous/anxious.      Objective    Physical Exam  Constitutional: Oriented to person, place, and time. Appears well-developed and well-nourished.   HENT: visible yellow PND  Head: frontal and maxillary sinus tenderness. Normocephalic and atraumatic.   Eyes: EOM are normal. Pupils are equal, round, and reactive to light.   Neck: Normal range of motion. Neck supple.   Cardiovascular: Normal rate, regular rhythm and normal heart sounds.    Pulmonary/Chest: Effort normal and breath sounds normal. No respiratory distress.  Has no wheezes or rales. Exhibits no chest wall tenderness.   Abdominal: Soft. Bowel sounds are normal. Exhibits no distension and no mass. There is no tenderness.   Musculoskeletal: tender right 5th DIP, restricted ROM.   Neurological: Alert and oriented to person, place, and time.   Skin: Skin is warm and dry.   Psychiatric: Has a normal mood and affect. Behavior is normal. Judgment and thought content normal.       Visit Vitals   • /68   • Pulse 78   • Temp 96.8 °F (36 °C)   • Resp 14   • Ht 62\" (157.5 cm)   • Wt 152 lb 3 oz (69 kg)   • SpO2 98%   • BMI 27.84 kg/m2       Nursing note and vitals reviewed.          Assessment/Plan   Dang was seen today for sinus problem.    Diagnoses and all orders for this visit:    Acute non-recurrent pansinusitis  -     cefprozil (CEFZIL) 500 MG tablet; Take 1 tablet by mouth 2 (Two) Times a Day for 10 days.  -     azelastine (ASTELIN) 0.1 % nasal spray; 2 sprays into each nostril 2 (Two) Times a Day. Use in each nostril as directed  Does not tolerate Flonase. Using decongestants without improvement.       Finger pain, right  Likely damaged extensor tendon of " 5th finger when she jammed the finger 6 weeks ago. May need referral to ortho but not interested at this time.

## 2019-12-29 DIAGNOSIS — E07.9 THYROID DISORDER: ICD-10-CM

## 2019-12-29 DIAGNOSIS — E07.9 DISORDER OF THYROID: ICD-10-CM

## 2019-12-30 RX ORDER — LEVOTHYROXINE SODIUM 112 UG/1
112 TABLET ORAL DAILY
Qty: 90 TABLET | Refills: 1 | Status: SHIPPED | OUTPATIENT
Start: 2019-12-30 | End: 2020-06-24 | Stop reason: SDUPTHER

## 2020-02-05 RX ORDER — CALCIUM CARBONATE 300MG(750)
1 TABLET,CHEWABLE ORAL DAILY
Qty: 90 TABLET | Refills: 3 | Status: SHIPPED | OUTPATIENT
Start: 2020-02-05 | End: 2021-05-13 | Stop reason: SDUPTHER

## 2020-02-27 DIAGNOSIS — G47.09 OTHER INSOMNIA: ICD-10-CM

## 2020-02-27 RX ORDER — ZOLPIDEM TARTRATE 10 MG/1
TABLET ORAL
Qty: 30 TABLET | Refills: 0 | Status: SHIPPED | OUTPATIENT
Start: 2020-02-27 | End: 2020-03-31 | Stop reason: SDUPTHER

## 2020-02-27 NOTE — TELEPHONE ENCOUNTER
When I pulled Vaibhav on Pt, I noted two new controlled substances. I spoke with Pt in regards to this. Pt states she had complete hysterectomy recently. She was prescribed Ambien ER 12.5 and Oxycodone/Acetaminophen 325mg/5mg. Pt states the Ambien ER 12.5 is too strong and makes her drowsy the next day.  She only wants to take the 10 mg of Ambien that you prescribe. I did advise Pt that in the future she needs to contact us when she's prescribed controlled substances so we are not surprised when we see it on her VAIBHAV. Pt agreed. Vaibhav printed, script pended

## 2020-03-31 DIAGNOSIS — G47.09 OTHER INSOMNIA: ICD-10-CM

## 2020-03-31 RX ORDER — ZOLPIDEM TARTRATE 10 MG/1
TABLET ORAL
Qty: 30 TABLET | Refills: 2 | Status: SHIPPED | OUTPATIENT
Start: 2020-03-31

## 2020-04-21 RX ORDER — MONTELUKAST SODIUM 10 MG/1
TABLET ORAL
Qty: 90 TABLET | Refills: 2 | Status: SHIPPED | OUTPATIENT
Start: 2020-04-21 | End: 2021-04-16

## 2020-05-13 ENCOUNTER — HOSPITAL ENCOUNTER (OUTPATIENT)
Dept: GENERAL RADIOLOGY | Facility: HOSPITAL | Age: 70
Discharge: HOME | End: 2020-05-13
Admitting: INTERNAL MEDICINE

## 2020-05-13 ENCOUNTER — OFFICE VISIT (OUTPATIENT)
Dept: INTERNAL MEDICINE | Facility: CLINIC | Age: 70
End: 2020-05-13

## 2020-05-13 VITALS
OXYGEN SATURATION: 98 % | SYSTOLIC BLOOD PRESSURE: 120 MMHG | TEMPERATURE: 97.2 F | HEIGHT: 62 IN | WEIGHT: 155 LBS | HEART RATE: 74 BPM | BODY MASS INDEX: 28.52 KG/M2 | DIASTOLIC BLOOD PRESSURE: 70 MMHG
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
## 2020-05-13 DIAGNOSIS — M79.602 ARM PAIN, DIFFUSE, LEFT: Primary | ICD-10-CM

## 2020-05-13 PROCEDURE — 73070 X-RAY EXAM OF ELBOW: CPT

## 2020-05-13 PROCEDURE — 73060 X-RAY EXAM OF HUMERUS: CPT

## 2020-05-13 PROCEDURE — 99213 OFFICE O/P EST LOW 20 MIN: CPT | Performed by: INTERNAL MEDICINE

## 2020-05-13 PROCEDURE — 73030 X-RAY EXAM OF SHOULDER: CPT

## 2020-05-13 NOTE — PROGRESS NOTES
Subjective   Dang Butler is a 69 y.o. female.     Chief Complaint   Patient presents with   • Fall     injured left arm, elbow and leg, tripped over puppy.pain worse with movement        History of Present Illness   Patient is here complaining of pain of her left arm area , patient states that she fell and tripped over her neighbor's dog  And stumbled  And fell backwards and landed on her back  And this was on [place] and she had pain in the  left arm , she was assisted and taken home ,  She cannot  extend her left arm completely     The following portions of the patient's history were reviewed and updated as appropriate: allergies, current medications, past family history, past medical history, past social history, past surgical history and problem list.    Review of Systems   Constitutional: Negative for appetite change, fatigue and fever.   HENT: Negative for congestion, ear discharge, ear pain, sinus pressure and sore throat.    Eyes: Negative for pain and discharge.   Respiratory: Negative for cough, chest tightness, shortness of breath and wheezing.    Cardiovascular: Negative for chest pain, palpitations and leg swelling.   Gastrointestinal: Negative for abdominal pain, blood in stool, constipation, diarrhea and nausea.   Endocrine: Negative for cold intolerance and heat intolerance.   Genitourinary: Negative for dysuria, flank pain and frequency.   Musculoskeletal: Negative for back pain and joint swelling.        Left arm pain   Skin: Negative for color change.   Allergic/Immunologic: Negative for environmental allergies and food allergies.   Neurological: Negative for dizziness, weakness, numbness and headaches.   Hematological: Negative for adenopathy. Does not bruise/bleed easily.   Psychiatric/Behavioral: Negative for behavioral problems and dysphoric mood. The patient is not nervous/anxious.          Current Outpatient Medications:   •  desoximetasone (TOPICORT) 0.05 % cream, Apply  topically to the  "appropriate area as directed Daily., Disp: 60 g, Rfl: 0  •  estradiol (MINIVELLE, VIVELLE-DOT) 0.05 MG/24HR patch, Place 0.5 patches on the skin as directed by provider., Disp: , Rfl:   •  fluocinonide (LIDEX) 0.05 % cream, Apply  topically 2 (two) times a day. Apply sparingly to affected area(s) twice daily, Disp: , Rfl:   •  levothyroxine (SYNTHROID, LEVOTHROID) 112 MCG tablet, Take 1 tablet by mouth Daily. As directed, Disp: 90 tablet, Rfl: 1  •  Magnesium 400 MG tablet, Take 1 tablet by mouth Daily., Disp: 90 tablet, Rfl: 3  •  meloxicam (MOBIC) 15 MG tablet, Take 1 tablet by mouth Daily As Needed for Mild Pain ., Disp: 90 tablet, Rfl: 3  •  montelukast (SINGULAIR) 10 MG tablet, TAKE ONE TABLET BY MOUTH EVERY EVENING, Disp: 90 tablet, Rfl: 2  •  pantoprazole (PROTONIX) 40 MG EC tablet, Take 1 tablet by mouth Daily., Disp: 90 tablet, Rfl: 3  •  triamcinolone (KENALOG) 0.1 % cream, Apply  topically to the appropriate area as directed 2 (Two) Times a Day., Disp: 60 g, Rfl: 1  •  zolpidem (AMBIEN) 10 MG tablet, Take 1/2-1 tablet nightly as needed for sleep., Disp: 30 tablet, Rfl: 2    Current Facility-Administered Medications:   •  cyanocobalamin injection 1,000 mcg, 1,000 mcg, Intramuscular, Q28 Days, Vermeesch, Marilyn K, MD, 1,000 mcg at 03/28/19 1035    Objective     Blood pressure 120/70, pulse 74, temperature 97.2 °F (36.2 °C), temperature source Temporal, height 157.5 cm (62.01\"), weight 70.3 kg (155 lb), SpO2 98 %.    Physical Exam   Constitutional: She is oriented to person, place, and time. She appears well-developed and well-nourished. No distress.   HENT:   Head: Normocephalic and atraumatic.   Right Ear: External ear normal.   Left Ear: External ear normal.   Nose: Nose normal.   Mouth/Throat: Oropharynx is clear and moist.   Eyes: Pupils are equal, round, and reactive to light. Conjunctivae and EOM are normal.   Neck: Neck supple. No thyromegaly present.   Cardiovascular: Normal rate, regular rhythm " and normal heart sounds.   Pulmonary/Chest: Effort normal and breath sounds normal. No respiratory distress.   Abdominal: Soft. Bowel sounds are normal. She exhibits no distension. There is no tenderness. There is no rebound.   Musculoskeletal: Normal range of motion. She exhibits tenderness. She exhibits no edema.   Left arm posterior tenderness with restriction of motion   Lymphadenopathy:     She has no cervical adenopathy.   Neurological: She is alert and oriented to person, place, and time.   No gross motor or sensory deficits   Skin: Skin is warm. She is not diaphoretic. There is erythema.   Left knee and left wrist grazed superficial lesions   Psychiatric: She has a normal mood and affect.   Nursing note and vitals reviewed.    Patient's Body mass index is 28.34 kg/m².        Results for orders placed or performed in visit on 11/12/19   Vitamin B12   Result Value Ref Range    Vitamin B-12 482 211 - 946 pg/mL         Assessment/Plan   Dang was seen today for fall.    Diagnoses and all orders for this visit:    Arm pain, diffuse, left  -     XR Shoulder 2+ View Left  -     XR elbow 2 vw bilateral  -     XR humerus left  -     Ambulatory Referral to Orthopedic Surgery  -     XR elbow 2 vw left    Fall, initial encounter    plan:  1.  Left arm pain: Most secondary to be a ligament or tendon injury, will obtain x-rays and refer patient to orthopedist             Rosy Fritz MD

## 2020-05-14 ENCOUNTER — TELEPHONE (OUTPATIENT)
Dept: INTERNAL MEDICINE | Facility: CLINIC | Age: 70
End: 2020-05-14

## 2020-05-15 ENCOUNTER — DOCUMENTATION (OUTPATIENT)
Dept: INTERNAL MEDICINE | Facility: CLINIC | Age: 70
End: 2020-05-15

## 2020-05-15 RX ORDER — TRAMADOL HYDROCHLORIDE 50 MG/1
50 TABLET ORAL EVERY 12 HOURS PRN
Qty: 60 TABLET | Refills: 0 | Status: SHIPPED | OUTPATIENT
Start: 2020-05-15 | End: 2021-05-03

## 2020-05-15 NOTE — PROGRESS NOTES
Spoke with patient and discussed x-rays results, will call in tramadol, she can continue meloxicam 1/day, she will need to see orthopedics and I will discuss this with the referral clerk that she can be seen at the earliest

## 2020-05-15 NOTE — PROGRESS NOTES
Xray  May show  Slight subluxation -  Change in position of the humerus , keep ortho appt , degeneration noted, I spoke to the patient today

## 2020-05-21 ENCOUNTER — OFFICE VISIT (OUTPATIENT)
Dept: ORTHOPEDIC SURGERY | Facility: CLINIC | Age: 70
End: 2020-05-21

## 2020-05-21 VITALS — BODY MASS INDEX: 28.52 KG/M2 | RESPIRATION RATE: 18 BRPM | HEIGHT: 62 IN | WEIGHT: 155 LBS

## 2020-05-21 DIAGNOSIS — S46.012A TRAUMATIC TEAR OF LEFT ROTATOR CUFF, UNSPECIFIED TEAR EXTENT, INITIAL ENCOUNTER: ICD-10-CM

## 2020-05-21 DIAGNOSIS — S43.002A SHOULDER SUBLUXATION, LEFT, INITIAL ENCOUNTER: ICD-10-CM

## 2020-05-21 DIAGNOSIS — M25.512 ACUTE PAIN OF LEFT SHOULDER: Primary | ICD-10-CM

## 2020-05-21 PROCEDURE — 99203 OFFICE O/P NEW LOW 30 MIN: CPT | Performed by: PHYSICIAN ASSISTANT

## 2020-05-21 NOTE — PROGRESS NOTES
Subjective   Patient ID: Dang Butler is a 69 y.o. right hand dominant female  Pain of the Left Shoulder (States she tripped over her dog and fell at home on 5/12/20 landing on the shoulder, no treatment, xray ordered by PCP)         History of Present Illness  Patient presents as a new patient with complaints of left shoulder pain that occurred on 5/12/2020 at home.  She tripped over her dog and fell landing on the left shoulder.  Patient states initially she had severe pain and could not move the left shoulder.  However, over the last several days her pain has improved as well as some range of motion.  She denies numbness or tingling.  She did have x-rays ordered by her PCP of the left shoulder which revealed possible subluxation of the humerus no acute fracture      Pain Score: 3  Pain Location: Shoulder  Pain Orientation: Left     Pain Descriptors: Aching  Pain Frequency: Intermittent  Pain Onset: Sudden     Clinical Progression: Gradually improving           Pain Intervention(s): Home medication, Rest()  Result of Injury: Yes  Work-Related Injury: No    Past Medical History:   Diagnosis Date   • Acute sinusitis    • Adverse effect of correct medicinal substance properly administered    • Allergic rhinitis    • Asthma    • Dysfunction of eustachian tube    • Esophageal reflux    • Generalized osteoarthritis     Unspecified site   • Hyperlipidemia    • Insomnia    • Skin lesion    • Thyroid disorder    • Vitamin D deficiency         Past Surgical History:   Procedure Laterality Date   • CHOLECYSTECTOMY     • PAP SMEAR         Family History   Problem Relation Age of Onset   • Asthma Other    • Cancer Other         breast       Social History     Socioeconomic History   • Marital status:      Spouse name: Not on file   • Number of children: Not on file   • Years of education: Not on file   • Highest education level: Not on file   Tobacco Use   • Smoking status: Never Smoker         Current Outpatient  Medications:   •  desoximetasone (TOPICORT) 0.05 % cream, Apply  topically to the appropriate area as directed Daily., Disp: 60 g, Rfl: 0  •  estradiol (MINIVELLE, VIVELLE-DOT) 0.05 MG/24HR patch, Place 0.5 patches on the skin as directed by provider., Disp: , Rfl:   •  fluocinonide (LIDEX) 0.05 % cream, Apply  topically 2 (two) times a day. Apply sparingly to affected area(s) twice daily, Disp: , Rfl:   •  levothyroxine (SYNTHROID, LEVOTHROID) 112 MCG tablet, Take 1 tablet by mouth Daily. As directed, Disp: 90 tablet, Rfl: 1  •  Magnesium 400 MG tablet, Take 1 tablet by mouth Daily., Disp: 90 tablet, Rfl: 3  •  meloxicam (MOBIC) 15 MG tablet, Take 1 tablet by mouth Daily As Needed for Mild Pain ., Disp: 90 tablet, Rfl: 3  •  montelukast (SINGULAIR) 10 MG tablet, TAKE ONE TABLET BY MOUTH EVERY EVENING, Disp: 90 tablet, Rfl: 2  •  pantoprazole (PROTONIX) 40 MG EC tablet, Take 1 tablet by mouth Daily., Disp: 90 tablet, Rfl: 3  •  traMADol (ULTRAM) 50 MG tablet, Take 1 tablet by mouth Every 12 (Twelve) Hours As Needed for Severe Pain ., Disp: 60 tablet, Rfl: 0  •  triamcinolone (KENALOG) 0.1 % cream, Apply  topically to the appropriate area as directed 2 (Two) Times a Day., Disp: 60 g, Rfl: 1  •  zolpidem (AMBIEN) 10 MG tablet, Take 1/2-1 tablet nightly as needed for sleep., Disp: 30 tablet, Rfl: 2    Current Facility-Administered Medications:   •  cyanocobalamin injection 1,000 mcg, 1,000 mcg, Intramuscular, Q28 Days, Vermeesch, Marilyn K, MD, 1,000 mcg at 03/28/19 1035    Allergies   Allergen Reactions   • Amoxicillin GI Intolerance       Review of Systems   Constitutional: Negative for diaphoresis, fever and unexpected weight change.   HENT: Negative for dental problem and sore throat.    Eyes: Negative for visual disturbance.   Respiratory: Negative for shortness of breath.    Cardiovascular: Negative for chest pain.   Gastrointestinal: Negative for abdominal pain, constipation, diarrhea, nausea and vomiting.  "  Genitourinary: Negative for difficulty urinating and frequency.   Musculoskeletal: Positive for arthralgias (left shoulder).   Neurological: Negative for headaches.   Hematological: Does not bruise/bleed easily.       I have reviewed the medical and surgical history, family history, social history, medications, and/or allergies, and the review of systems of this report.    Objective   Resp 18   Ht 157.5 cm (62\")   Wt 70.3 kg (155 lb)   BMI 28.35 kg/m²    Physical Exam   Constitutional: She is oriented to person, place, and time. She appears well-developed and well-nourished.   HENT:   Head: Normocephalic.   Pulmonary/Chest: Effort normal. No respiratory distress.   Musculoskeletal:        Left shoulder: She exhibits decreased range of motion, tenderness, bony tenderness and pain. She exhibits no crepitus and no deformity.        Left elbow: She exhibits normal range of motion. No tenderness found. No radial head, no medial epicondyle, no lateral epicondyle and no olecranon process tenderness noted.        Left wrist: She exhibits normal range of motion, no tenderness, no bony tenderness, no swelling, no effusion, no crepitus and no deformity.   Neurological: She is alert and oriented to person, place, and time.   Psychiatric: She has a normal mood and affect.   Nursing note and vitals reviewed.    Left Shoulder Exam     Range of Motion   Active abduction: 70   Forward flexion: 70     Tests   Drop arm: positive             Neurologic Exam     Mental Status   Oriented to person, place, and time.        Clinically, there is no gross findings to suggest left shoulder dislocation    The physical exam is somewhat limited and I cannot perform the rotator cuff assessment due to pain and limited mobility.    Assessment/Plan   Independent Review of Radiographic Studies:    LEFT SHOULDER     INDICATION: Pain.     FINDINGS: Three views of the left shoulder without comparison  demonstrates some anterior subluxation of the " humeral head. No definite  complete dislocation. There does however appear to be a shallow  Hill-Sachs deformity suggestive of a previous dislocation. Advanced  degenerative change of the acromioclavicular joint and moderate  degenerative change of the glenohumeral joint. Prominent degenerative  changes in the visualized spine.     IMPRESSION:  1. Slight anterior subluxation of the humeral head without definite  complete dislocation.  2. Otherwise, chronic appearing findings. Consider MRI if symptoms  persist.    Bilateral elbows     INDICATION: Pain     FINDINGS: 3 views of each elbow without comparison. No obvious joint  effusion. No dislocation, bony destruction or acute fracture identified.  No radiopaque foreign body.     IMPRESSION:  No acute bony abnormality.     This report was finalized on 5/13/2020 3:01 PM by Ross Buck MD.    Procedures       Dang was seen today for pain.    Diagnoses and all orders for this visit:    Acute pain of left shoulder  -     MRI shoulder left wo contrast    Shoulder subluxation, left, initial encounter  -     MRI shoulder left wo contrast    Traumatic tear of left rotator cuff, unspecified tear extent, initial encounter  -     MRI shoulder left wo contrast       Orthopedic activities reviewed and patient expressed appreciation  Discussion of orthopedic goals  Risk, benefits, and merits of treatment alternatives reviewed with the patient and questions answered    Recommendations/Plan:  Exercise, medications, injections, other patient advice, and return appointment as noted.  Patient is encouraged to call or return for any issues or concerns.    I would recommend the use of a shoulder sling until we reevaluate you and review the MRI results  .  I feel the patient requires an MRI of the right shoulder due to the abnormal x-ray findings.  If she does have a complete rotator cuff tear she would benefit from either rotator cuff repair surgery versus reverse shoulder  arthroplasty  Patient agreeable to call or return sooner for any concerns.               EMR Dragon-transcription disclaimer:  This encounter note is an electronic transcription of spoken language to printed text.  Electronic transcription of spoken language may permit erroneous or at times nonsensical words or phrases to be inadvertently transcribed.  Although I have reviewed the note for such errors, some may still exist  Answers for HPI/ROS submitted by the patient on 5/19/2020   What is the primary reason for your visit?: Other  Please describe your symptoms.: Referred by Dr Mason.      Fell - arm injured  Have you had these symptoms before?: No  How long have you been having these symptoms?: 5-7 days  Please list any medications you are currently taking for this condition.: Andrewevelisa  Please describe any probable cause for these symptoms. : Fall

## 2020-06-05 ENCOUNTER — HOSPITAL ENCOUNTER (OUTPATIENT)
Dept: MRI IMAGING | Facility: HOSPITAL | Age: 70
Discharge: HOME | End: 2020-06-05
Admitting: PHYSICIAN ASSISTANT

## 2020-06-05 PROCEDURE — 73221 MRI JOINT UPR EXTREM W/O DYE: CPT

## 2020-06-23 ENCOUNTER — OFFICE VISIT (OUTPATIENT)
Dept: INTERNAL MEDICINE | Facility: CLINIC | Age: 70
End: 2020-06-23

## 2020-06-23 ENCOUNTER — RESULTS ENCOUNTER (OUTPATIENT)
Dept: INTERNAL MEDICINE | Facility: CLINIC | Age: 70
End: 2020-06-23

## 2020-06-23 VITALS
DIASTOLIC BLOOD PRESSURE: 80 MMHG | SYSTOLIC BLOOD PRESSURE: 122 MMHG | HEART RATE: 71 BPM | BODY MASS INDEX: 28.34 KG/M2 | WEIGHT: 154 LBS | TEMPERATURE: 97.5 F | OXYGEN SATURATION: 97 % | HEIGHT: 62 IN

## 2020-06-23 DIAGNOSIS — Z12.11 SCREENING FOR COLON CANCER: ICD-10-CM

## 2020-06-23 DIAGNOSIS — Z00.00 ENCOUNTER FOR MEDICARE ANNUAL WELLNESS EXAM: Primary | ICD-10-CM

## 2020-06-23 DIAGNOSIS — E07.9 THYROID DISORDER: ICD-10-CM

## 2020-06-23 DIAGNOSIS — E53.8 VITAMIN B 12 DEFICIENCY: ICD-10-CM

## 2020-06-23 DIAGNOSIS — K21.9 GASTROESOPHAGEAL REFLUX DISEASE WITHOUT ESOPHAGITIS: ICD-10-CM

## 2020-06-23 DIAGNOSIS — S46.002S ROTATOR CUFF INJURY, LEFT, SEQUELA: ICD-10-CM

## 2020-06-23 DIAGNOSIS — Z00.00 ENCOUNTER FOR MEDICARE ANNUAL WELLNESS EXAM: ICD-10-CM

## 2020-06-23 DIAGNOSIS — E78.2 MIXED HYPERLIPIDEMIA: ICD-10-CM

## 2020-06-23 DIAGNOSIS — M15.9 GENERALIZED OSTEOARTHRITIS: ICD-10-CM

## 2020-06-23 DIAGNOSIS — E55.9 VITAMIN D DEFICIENCY: ICD-10-CM

## 2020-06-23 PROBLEM — Z01.818 PRE-OP EXAM: Status: RESOLVED | Noted: 2019-12-12 | Resolved: 2020-06-23

## 2020-06-23 PROCEDURE — 96160 PT-FOCUSED HLTH RISK ASSMT: CPT | Performed by: INTERNAL MEDICINE

## 2020-06-23 PROCEDURE — 99397 PER PM REEVAL EST PAT 65+ YR: CPT | Performed by: INTERNAL MEDICINE

## 2020-06-23 PROCEDURE — G0439 PPPS, SUBSEQ VISIT: HCPCS | Performed by: INTERNAL MEDICINE

## 2020-06-23 NOTE — PROGRESS NOTES
The ABCs of the Annual Wellness Visit  Subsequent Medicare Wellness Visit    Chief Complaint   Patient presents with   • Medicare Wellness-subsequent     Pt states she's unable to lift left shoulder over her head.        Subjective   History of Present Illness:  Dang Butler is a 69 y.o. female who presents for a Subsequent Medicare Wellness Visit.  PMH of HLD, allergies, GERD, hypothyroidism, DJD, insomnia and vitamin D/B12 deficiency. She has had a fall about 5 weeks ago and injured her left shoulder-she saw Thang Gifford with Dr Richey.  She has seen ortho and had an MRI-MRI reveals significant abnormalities, consistent with chronic tendon changes consistent with rotator cuff injury and possible history of anterior shoulder dislocation in the past.  She was not contacted with MRI results and was not offered physical therapy.  She does not have much GERD.  She remains on mobic and takes with food.  She takes her thyroid med with water prior to other meds.  She does have some constipation and perhaps a change in her voice.  She remains on ambien 5-10 mg every PM.  She is taking her B12 oral every other day.      HEALTH RISK ASSESSMENT    Recent Hospitalizations:  Recently treated at the following:  Other: [facility]    Current Medical Providers:  Patient Care Team:  Vermeesch, Marilyn K, MD as PCP - General    Smoking Status:  Social History     Tobacco Use   Smoking Status Never Smoker       Alcohol Consumption:  Social History     Substance and Sexual Activity   Alcohol Use Not on file       Depression Screen:   PHQ-2/PHQ-9 Depression Screening 6/23/2020   Little interest or pleasure in doing things 0   Feeling down, depressed, or hopeless 0   Total Score 0       Fall Risk Screen:  BRENDAADI Fall Risk Assessment was completed, and patient is at MODERATE risk for falls. Assessment completed on:6/23/2020    Health Habits and Functional and Cognitive Screening:  Functional & Cognitive Status 6/23/2020   Do you have  difficulty preparing food and eating? No   Do you have difficulty bathing yourself, getting dressed or grooming yourself? Yes   Do you have difficulty using the toilet? No   Do you have difficulty moving around from place to place? No   Do you have trouble with steps or getting out of a bed or a chair? No   Current Diet Limited Junk Food   Dental Exam Up to date   Eye Exam Up to date   Exercise (times per week) 0 times per week   Current Exercise Activities Include None   Do you need help using the phone?  No   Are you deaf or do you have serious difficulty hearing?  No   Do you need help with transportation? No   Do you need help shopping? No   Do you need help preparing meals?  No   Do you need help with housework?  No   Do you need help with laundry? No   Do you need help taking your medications? No   Do you need help managing money? No   Do you ever drive or ride in a car without wearing a seat belt? No   Have you felt unusual stress, anger or loneliness in the last month? No   Who do you live with? Spouse   If you need help, do you have trouble finding someone available to you? No   Do you have difficulty concentrating, remembering or making decisions? No         Does the patient have evidence of cognitive impairment? No    Asprin use counseling:Does not need ASA (and currently is not on it)    Age-appropriate Screening Schedule:  Refer to the list below for future screening recommendations based on patient's age, sex and/or medical conditions. Orders for these recommended tests are listed in the plan section. The patient has been provided with a written plan.    Health Maintenance   Topic Date Due   • COLONOSCOPY  04/29/2016   • MAMMOGRAM  03/21/2018   • LIPID PANEL  04/25/2020   • INFLUENZA VACCINE  08/01/2020   • ZOSTER VACCINE  Completed   • TDAP/TD VACCINES  Discontinued          The following portions of the patient's history were reviewed and updated as appropriate: allergies, current medications, past  family history, past medical history, past social history, past surgical history and problem list.    Outpatient Medications Prior to Visit   Medication Sig Dispense Refill   • Mirabegron ER (Myrbetriq) 50 MG tablet sustained-release 24 hour 24 hr tablet Daily.     • desoximetasone (TOPICORT) 0.05 % cream Apply  topically to the appropriate area as directed Daily. 60 g 0   • estradiol (MINIVELLE, VIVELLE-DOT) 0.05 MG/24HR patch Place 0.5 patches on the skin as directed by provider.     • fluocinonide (LIDEX) 0.05 % cream Apply  topically 2 (two) times a day. Apply sparingly to affected area(s) twice daily     • levothyroxine (SYNTHROID, LEVOTHROID) 112 MCG tablet Take 1 tablet by mouth Daily. As directed 90 tablet 1   • Magnesium 400 MG tablet Take 1 tablet by mouth Daily. 90 tablet 3   • meloxicam (MOBIC) 15 MG tablet Take 1 tablet by mouth Daily As Needed for Mild Pain . 90 tablet 3   • montelukast (SINGULAIR) 10 MG tablet TAKE ONE TABLET BY MOUTH EVERY EVENING 90 tablet 2   • pantoprazole (PROTONIX) 40 MG EC tablet Take 1 tablet by mouth Daily. 90 tablet 3   • traMADol (ULTRAM) 50 MG tablet Take 1 tablet by mouth Every 12 (Twelve) Hours As Needed for Severe Pain . 60 tablet 0   • triamcinolone (KENALOG) 0.1 % cream Apply  topically to the appropriate area as directed 2 (Two) Times a Day. 60 g 1   • zolpidem (AMBIEN) 10 MG tablet Take 1/2-1 tablet nightly as needed for sleep. 30 tablet 2     Facility-Administered Medications Prior to Visit   Medication Dose Route Frequency Provider Last Rate Last Dose   • cyanocobalamin injection 1,000 mcg  1,000 mcg Intramuscular Q28 Days Vermeesch, Marilyn K, MD   1,000 mcg at 03/28/19 1035       Patient Active Problem List   Diagnosis   • Atopic rhinitis   • Gastroesophageal reflux disease   • Generalized osteoarthritis   • Hyperlipidemia   • Insomnia   • Vitamin D deficiency   • Menopausal and postmenopausal disorder   • Atopic dermatitis   • Thyroid disorder   • Chronic  "fatigue   • Encounter for Medicare annual wellness exam   • Vitamin B 12 deficiency   • Screening for colon cancer   • Rotator cuff injury, left, sequela       Advanced Care Planning:  ACP discussion was held with the patient during this visit. Patient does not have an advance directive, information provided.    Review of Systems   Constitutional: Positive for fatigue.   HENT: Negative.    Eyes: Negative.    Respiratory:        Snores at night, she has been tested for SHERI several yrs ago   Cardiovascular: Negative.    Gastrointestinal: Positive for constipation.   Endocrine: Negative.    Genitourinary: Negative.    Musculoskeletal: Positive for neck pain.        Left shoulder pain   Skin: Negative.    Allergic/Immunologic: Positive for food allergies.        Gluten allergy   Neurological: Negative.    Hematological: Negative.    Psychiatric/Behavioral: Positive for sleep disturbance.       Compared to one year ago, the patient feels her physical health is the same.  Compared to one year ago, the patient feels her mental health is the same.    Reviewed chart for potential of high risk medication in the elderly: yes  Reviewed chart for potential of harmful drug interactions in the elderly:yes    Objective         Vitals:    06/23/20 1030   BP: 122/80   Pulse: 71   Temp: 97.5 °F (36.4 °C)   SpO2: 97%   Weight: 69.9 kg (154 lb)   Height: 157.5 cm (62\")   PainSc:   1       Body mass index is 28.17 kg/m².  Discussed the patient's BMI with her. The BMI is above average; BMI management plan is completed.    Physical Exam   Constitutional: She is oriented to person, place, and time. She appears well-developed and well-nourished. No distress.   Very kind and pleasant female, she appears her stated age, she is wearing a mask in no distress today   HENT:   Head: Normocephalic and atraumatic.   Right Ear: External ear normal.   Left Ear: External ear normal.   TMs normal, no wax in canals   Eyes: Pupils are equal, round, and " reactive to light. Conjunctivae and EOM are normal.   Neck: Normal range of motion. Neck supple. No thyromegaly present.   Cardiovascular: Normal rate, regular rhythm, normal heart sounds and intact distal pulses.   No murmur heard.  No carotid bruits   Pulmonary/Chest: Effort normal and breath sounds normal. No respiratory distress. She has no wheezes.   Abdominal: Soft. Bowel sounds are normal. She exhibits no distension. There is no tenderness.   Musculoskeletal: Normal range of motion. She exhibits no edema.   Left shoulder: Active abduction is approximately 90 degrees and forward flexion is approximately 90 degrees, drop test is negative, she has some tenderness to palpation over deltoid muscle   Lymphadenopathy:     She has no cervical adenopathy.   Neurological: She is alert and oriented to person, place, and time. No cranial nerve deficit or sensory deficit. She exhibits normal muscle tone. Coordination normal.   Skin: Skin is warm and dry. No rash noted.   Psychiatric: She has a normal mood and affect. Her behavior is normal. Judgment and thought content normal.   Nursing note and vitals reviewed.            Assessment/Plan   Medicare Risks and Personalized Health Plan  CMS Preventative Services Quick Reference  Advance Directive Discussion  Cardiovascular risk  Diabetic Lab Screening   Inactivity/Sedentary  Obesity/Overweight     The above risks/problems have been discussed with the patient.  Pertinent information has been shared with the patient in the After Visit Summary.  Follow up plans and orders are seen below in the Assessment/Plan Section.    Diagnoses and all orders for this visit:    1. Encounter for Medicare annual wellness exam (Primary)  -     CBC & Differential  -     Comprehensive Metabolic Panel  -     Lipid Panel With / Chol / HDL Ratio  -     T4, Free  -     TSH  -     Vitamin B12  -     Cologuard - Stool, Per Rectum; Future    2. Mixed hyperlipidemia  -     Comprehensive Metabolic  Panel  -     Lipid Panel With / Chol / HDL Ratio    3. Gastroesophageal reflux disease without esophagitis  -     CBC & Differential    4. Thyroid disorder  -     T4, Free  -     TSH    5. Vitamin B 12 deficiency  -     Vitamin B12    6. Vitamin D deficiency    7. Screening for colon cancer  -     Cologuard - Stool, Per Rectum; Future    8. Rotator cuff injury, left, sequela  -     Ambulatory Referral to Physical Therapy Evaluate and treat    9. Generalized osteoarthritis    Labs as noted  cologuard ordered  Her mammograms are done [facility] at [facility]  Follow heart healthy/low cholesterol diet  Avoid processed/fried foods/fast food  Exercise as tolerated up to 30 minutes 5 days a week  Take all medications as prescribed  Refer PT for left shoulder chronic tendinopathy, rotator cuff injury  Continue every other day vitamin B12 and daily vitamin D  Continue Protonix for GERD, currently well controlled  She is currently on Myrbetriq for urinary frequency per her gynecologist  Encouraged her to try to limit use of Ambien and use a half a tablet rather than 1 full tablet for sleep at night  Continue meloxicam with PRN tramadol for underlying osteoarthritis of knees  Follow-up with orthopedics for left shoulder pain as needed      Follow Up:  Return in about 6 months (around 12/23/2020) for Next scheduled follow up.     An After Visit Summary and PPPS were given to the patient.

## 2020-06-24 DIAGNOSIS — E07.9 DISORDER OF THYROID: ICD-10-CM

## 2020-06-24 DIAGNOSIS — E07.9 THYROID DISORDER: ICD-10-CM

## 2020-06-24 LAB
ALBUMIN SERPL-MCNC: 4.2 G/DL (ref 3.5–5.2)
ALBUMIN/GLOB SERPL: 1.4 G/DL
ALP SERPL-CCNC: 82 U/L (ref 39–117)
ALT SERPL-CCNC: 15 U/L (ref 1–33)
AST SERPL-CCNC: 17 U/L (ref 1–32)
BASOPHILS # BLD AUTO: 0.06 10*3/MM3 (ref 0–0.2)
BASOPHILS NFR BLD AUTO: 1.2 % (ref 0–1.5)
BILIRUB SERPL-MCNC: 0.5 MG/DL (ref 0.2–1.2)
BUN SERPL-MCNC: 12 MG/DL (ref 8–23)
BUN/CREAT SERPL: 14.5 (ref 7–25)
CALCIUM SERPL-MCNC: 9.6 MG/DL (ref 8.6–10.5)
CHLORIDE SERPL-SCNC: 105 MMOL/L (ref 98–107)
CHOLEST SERPL-MCNC: 201 MG/DL (ref 0–200)
CHOLEST/HDLC SERPL: 3.59 {RATIO}
CO2 SERPL-SCNC: 24.4 MMOL/L (ref 22–29)
CREAT SERPL-MCNC: 0.83 MG/DL (ref 0.57–1)
EOSINOPHIL # BLD AUTO: 0.25 10*3/MM3 (ref 0–0.4)
EOSINOPHIL NFR BLD AUTO: 5.2 % (ref 0.3–6.2)
ERYTHROCYTE [DISTWIDTH] IN BLOOD BY AUTOMATED COUNT: 12.3 % (ref 12.3–15.4)
GLOBULIN SER CALC-MCNC: 2.9 GM/DL
GLUCOSE SERPL-MCNC: 90 MG/DL (ref 65–99)
HCT VFR BLD AUTO: 46.8 % (ref 34–46.6)
HDLC SERPL-MCNC: 56 MG/DL (ref 40–60)
HGB BLD-MCNC: 15.7 G/DL (ref 12–15.9)
IMM GRANULOCYTES # BLD AUTO: 0.01 10*3/MM3 (ref 0–0.05)
IMM GRANULOCYTES NFR BLD AUTO: 0.2 % (ref 0–0.5)
LDLC SERPL CALC-MCNC: 115 MG/DL (ref 0–100)
LYMPHOCYTES # BLD AUTO: 1.35 10*3/MM3 (ref 0.7–3.1)
LYMPHOCYTES NFR BLD AUTO: 28.1 % (ref 19.6–45.3)
MCH RBC QN AUTO: 30.4 PG (ref 26.6–33)
MCHC RBC AUTO-ENTMCNC: 33.5 G/DL (ref 31.5–35.7)
MCV RBC AUTO: 90.7 FL (ref 79–97)
MONOCYTES # BLD AUTO: 0.52 10*3/MM3 (ref 0.1–0.9)
MONOCYTES NFR BLD AUTO: 10.8 % (ref 5–12)
NEUTROPHILS # BLD AUTO: 2.62 10*3/MM3 (ref 1.7–7)
NEUTROPHILS NFR BLD AUTO: 54.5 % (ref 42.7–76)
NRBC BLD AUTO-RTO: 0 /100 WBC (ref 0–0.2)
PLATELET # BLD AUTO: 161 10*3/MM3 (ref 140–450)
POTASSIUM SERPL-SCNC: 4.6 MMOL/L (ref 3.5–5.2)
PROT SERPL-MCNC: 7.1 G/DL (ref 6–8.5)
RBC # BLD AUTO: 5.16 10*6/MM3 (ref 3.77–5.28)
SODIUM SERPL-SCNC: 139 MMOL/L (ref 136–145)
T4 FREE SERPL-MCNC: 1.46 NG/DL (ref 0.93–1.7)
TRIGL SERPL-MCNC: 152 MG/DL (ref 0–150)
TSH SERPL DL<=0.005 MIU/L-ACNC: 2.78 UIU/ML (ref 0.27–4.2)
VIT B12 SERPL-MCNC: 545 PG/ML (ref 211–946)
VLDLC SERPL CALC-MCNC: 30.4 MG/DL
WBC # BLD AUTO: 4.81 10*3/MM3 (ref 3.4–10.8)

## 2020-06-24 RX ORDER — LEVOTHYROXINE SODIUM 112 UG/1
112 TABLET ORAL DAILY
Qty: 90 TABLET | Refills: 3 | Status: SHIPPED | OUTPATIENT
Start: 2020-06-24 | End: 2021-05-03 | Stop reason: SDUPTHER

## 2020-08-25 DIAGNOSIS — M15.9 GENERALIZED OSTEOARTHRITIS: Primary | ICD-10-CM

## 2020-12-14 ENCOUNTER — TELEMEDICINE (OUTPATIENT)
Dept: INTERNAL MEDICINE | Facility: CLINIC | Age: 70
End: 2020-12-14

## 2020-12-14 VITALS — HEIGHT: 62 IN | BODY MASS INDEX: 27.42 KG/M2 | TEMPERATURE: 97.3 F | WEIGHT: 149 LBS

## 2020-12-14 DIAGNOSIS — E07.9 THYROID DISORDER: ICD-10-CM

## 2020-12-14 DIAGNOSIS — E78.2 MIXED HYPERLIPIDEMIA: Primary | ICD-10-CM

## 2020-12-14 DIAGNOSIS — J30.1 SEASONAL ALLERGIC RHINITIS DUE TO POLLEN: ICD-10-CM

## 2020-12-14 DIAGNOSIS — K21.9 GASTROESOPHAGEAL REFLUX DISEASE WITHOUT ESOPHAGITIS: ICD-10-CM

## 2020-12-14 DIAGNOSIS — M15.9 GENERALIZED OSTEOARTHRITIS: ICD-10-CM

## 2020-12-14 DIAGNOSIS — E53.8 VITAMIN B 12 DEFICIENCY: ICD-10-CM

## 2020-12-14 PROCEDURE — 99214 OFFICE O/P EST MOD 30 MIN: CPT | Performed by: INTERNAL MEDICINE

## 2020-12-14 RX ORDER — PANTOPRAZOLE SODIUM 40 MG/1
40 TABLET, DELAYED RELEASE ORAL DAILY
Qty: 90 TABLET | Refills: 3 | Status: SHIPPED | OUTPATIENT
Start: 2020-12-14 | End: 2021-08-02 | Stop reason: SDUPTHER

## 2020-12-14 RX ORDER — TRIAMCINOLONE ACETONIDE 1 MG/G
CREAM TOPICAL 2 TIMES DAILY
Qty: 60 G | Refills: 1 | Status: SHIPPED | OUTPATIENT
Start: 2020-12-14 | End: 2022-10-24 | Stop reason: SDUPTHER

## 2020-12-14 RX ORDER — MELOXICAM 15 MG/1
15 TABLET ORAL DAILY PRN
Qty: 90 TABLET | Refills: 3 | Status: SHIPPED | OUTPATIENT
Start: 2020-12-14 | End: 2022-02-07 | Stop reason: SDUPTHER

## 2020-12-14 NOTE — PROGRESS NOTES
Chief Complaint   Patient presents with   • Follow-up     6 months for GERD, HLD, insomnia, and Thyroid disorder.     Subjective   Dang Butler is a 70 y.o. female.     Telemedicine video visit with patient today for followup of HLD, hypothyroid, allergies/eczema, GERD, vit B12 def, DJD and insomnia.  HLD-lipid panel in acceptable range in June of this year.  Hypothyroid- she takes her thyroid med daily with just water, no difficulty swallowing, choking, constipation  Allergies/eczema- she remains on singulair.  She is taking a claritin or zyrtec daily.     GERD-  No GERD symptoms with use of protonix  Vit B 12 def-  She is taking B12 1000 mcg every other day  DJD- she takes mobic only as needed. Her DJD bothers her knees and hands the most  Insomnia- she remains on ambien 5 mg almost every night.   No signs of abuse.  She sleeps well with this medication.  Denies any current anxiety or depression symptoms.  She is primary caregiver for her  who is currently bedbound and quite ill.  HCM-seasonal flu vaccine, pneumococcal, hepatitis A and shingles vaccines are all up-to-date.  She has had a mammogram this yr.  She is doing well since her hysterectomy.  She had a yeast infection a few weeks ago, she discussed this with her GYN and had this treated.   She has resumed myrbetriq for for her bladder a few days ago and it has been helpful.         The following portions of the patient's history were reviewed and updated as appropriate: allergies, current medications, past family history, past medical history, past social history, past surgical history and problem list.    Review of Systems   HENT: Negative for trouble swallowing and voice change.    Gastrointestinal: Negative for abdominal pain and constipation.   Genitourinary:        Urine incontinence, improved since starting Myrbetriq   Musculoskeletal: Positive for arthralgias.   Allergic/Immunologic: Positive for environmental allergies.  "  Psychiatric/Behavioral: Negative for dysphoric mood and sleep disturbance. The patient is not nervous/anxious.    All other systems reviewed and are negative.      Objective   Temp 97.3 °F (36.3 °C)   Ht 157.5 cm (62\")   Wt 67.6 kg (149 lb)   BMI 27.25 kg/m²   Body mass index is 27.25 kg/m².  Physical Exam  Vitals signs and nursing note reviewed.   Constitutional:       General: She is not in acute distress.     Appearance: Normal appearance. She is not ill-appearing.      Comments: Very kind and pleasant female, appears her age and in no distress   HENT:      Head: Normocephalic and atraumatic.      Right Ear: External ear normal.      Left Ear: External ear normal.   Eyes:      General:         Right eye: No discharge.         Left eye: No discharge.      Extraocular Movements: Extraocular movements intact.   Pulmonary:      Effort: Pulmonary effort is normal. No respiratory distress.   Neurological:      Mental Status: She is alert and oriented to person, place, and time.   Psychiatric:         Mood and Affect: Mood normal.         Behavior: Behavior normal.         Thought Content: Thought content normal.         Judgment: Judgment normal.         Assessment/Plan   Dang Butler is here today and the following problems have been addressed:      Diagnoses and all orders for this visit:    1. Mixed hyperlipidemia (Primary)    2. Gastroesophageal reflux disease without esophagitis    3. Seasonal allergic rhinitis due to pollen    4. Vitamin B 12 deficiency    5. Thyroid disorder    6. Generalized osteoarthritis    Follow heart healthy/low cholesterol diet  Avoid processed/fried foods/fast food  Exercise as tolerated up to 30 minutes 5 days a week  Take all medications as prescribed  Continue Protonix, GERD is well controlled  Singulair is working well for allergies, she takes Claritin or Zyrtec as needed  Continue vitamin B12 1000 mcg every other day  Last thyroid function tests were in normal range, continue " current dose of levothyroxine  Continue Mobic as needed for underlying osteoarthritis, recommend Tylenol extra strength 2 tablets before bed at night to help with arthritis pain that occasionally awakens her from sleep  Follow-up with gynecologist regarding hormone replacement therapy as well as for treatment of urine incontinence.  She states that Myrbetriq is working well for her incontinence symptoms at this time  Continue Ambien 5 mg nightly as needed for sleep, no signs of abuse and Sherman is always appropriate    Return to clinic in 6 months for Medicare wellness exam with labs    Please note that portions of this note were completed with a voice recognition program.  Efforts were made to edit dictation, but occasionally words are mistranscribed.You have chosen to receive care through a telehealth visit.  Do you consent to use a video/audio connection for your medical care today? Yes

## 2021-01-05 DIAGNOSIS — F51.01 PRIMARY INSOMNIA: Primary | ICD-10-CM

## 2021-01-05 RX ORDER — LORAZEPAM 0.5 MG/1
0.5 TABLET ORAL EVERY 8 HOURS PRN
Qty: 5 TABLET | Refills: 0 | Status: SHIPPED | OUTPATIENT
Start: 2021-01-05 | End: 2021-01-12

## 2021-01-12 ENCOUNTER — OFFICE VISIT (OUTPATIENT)
Dept: INTERNAL MEDICINE | Facility: CLINIC | Age: 71
End: 2021-01-12

## 2021-01-12 DIAGNOSIS — F41.9 ANXIETY: Primary | ICD-10-CM

## 2021-01-12 PROCEDURE — 99442 PR PHYS/QHP TELEPHONE EVALUATION 11-20 MIN: CPT | Performed by: INTERNAL MEDICINE

## 2021-01-12 RX ORDER — ESCITALOPRAM OXALATE 10 MG/1
10 TABLET ORAL DAILY
Qty: 30 TABLET | Refills: 1 | Status: SHIPPED | OUTPATIENT
Start: 2021-01-12 | End: 2021-03-04

## 2021-01-12 NOTE — PROGRESS NOTES
Chief Complaint   Patient presents with   • Abstract     Pt states she's had alot of anxiety since passing of her , would like to discuss getting medication for anxiety. Pt did not  Ativan prescription.       Subjective   Dang Butler is a 70 y.o. female.     Telemedicine phone visit with patient today regarding worsening anxiety since loss of her  recently.  Patient has been having worsening anxiety since loss of her  a few weeks ago.  We initially sent in Ativan to her pharmacy during  however she never picked this medication up.  She has been requiring 10 mg of Ambien to sleep and last night she found an old 12.5 mg tablet and use this to help her sleep.  She denies any current depression symptoms.  She is having some anxiety throughout the day to the point that even with Ambien she is having difficulty with sleep due to her anxiety.  She has never been on any medications in the past for depression or anxiety.  She states that she does not really feel depressed, she just misses her  terribly.  This is the second time that she has lost a  and states that it is no easier the second time than it was the first.           The following portions of the patient's history were reviewed and updated as appropriate: allergies, current medications, past family history, past medical history, past social history, past surgical history and problem list.    Review of Systems   Psychiatric/Behavioral: Positive for sleep disturbance. Negative for dysphoric mood. The patient is nervous/anxious.        Objective   There were no vitals taken for this visit.  There is no height or weight on file to calculate BMI.  Physical Exam   No physical exam, phone visit only    Assessment/Plan   Dang Butler is here today and the following problems have been addressed:      Diagnoses and all orders for this visit:    1. Anxiety (Primary)    Other orders  -     escitalopram (Lexapro) 10 MG tablet;  Take 1 tablet by mouth Daily.  Dispense: 30 tablet; Refill: 1    Start Lexapro 10 mg p.o. daily, take after evening meal due to possible side effect of sleepiness  Encouragement and reassurance given to patient today  She may continue Ambien 5 to 10 mg p.o. nightly  We discussed weaning down Ambien as tolerated when anxiety symptoms improve    Return to clinic in 6 weeks for follow-up of anxiety, she may do this via video visit, phone visit or in person per her preference    Time devoted to visit: 12 minutes including time to review previous record, with 75% of time devoted to direct discussion    Please note that portions of this note were completed with a voice recognition program.  Efforts were made to edit dictation, but occasionally words are mistranscribed.You have chosen to receive care through a telephone visit. Do you consent to use a telephone visit for your medical care today? Yes  Active Parties: Marilyn Vermeesch (PCP), Adriana Aquino (CMA), and (Pt) Dang

## 2021-03-04 ENCOUNTER — OFFICE VISIT (OUTPATIENT)
Dept: INTERNAL MEDICINE | Facility: CLINIC | Age: 71
End: 2021-03-04

## 2021-03-04 VITALS — HEIGHT: 62 IN | BODY MASS INDEX: 27.6 KG/M2 | WEIGHT: 150 LBS

## 2021-03-04 DIAGNOSIS — F41.9 ANXIETY: Primary | ICD-10-CM

## 2021-03-04 DIAGNOSIS — F51.01 PRIMARY INSOMNIA: ICD-10-CM

## 2021-03-04 PROCEDURE — 99442 PR PHYS/QHP TELEPHONE EVALUATION 11-20 MIN: CPT | Performed by: INTERNAL MEDICINE

## 2021-03-04 RX ORDER — ESCITALOPRAM OXALATE 5 MG/1
5 TABLET ORAL DAILY
Qty: 90 TABLET | Refills: 1 | Status: SHIPPED | OUTPATIENT
Start: 2021-03-04 | End: 2021-05-03

## 2021-03-04 NOTE — PROGRESS NOTES
"Chief Complaint   Patient presents with   • Follow-up     for anxiety. Pt states she's only been taking 5 mg of Lexapro.     Subjective   Dang Butler is a 70 y.o. female.     Telemedicine phone visit with patient today for follow-up of anxiety.  Telemedicine phone visit was done with patient approximately 6 weeks ago for symptoms of anxiety.  Patient lost her second  a few months ago and is having significant grief reaction.  She has been having difficulty with sleep despite taking Ambien 10 mg daily.  A prescription for Ativan was called in during her 's  but she never filled this.  At last office visit 6 weeks ago we sent in Lexapro 10 mg for patient however she has been taking only 5 mg daily.  She tried to take 10 mg of lexapro and she felt too tired.  Lexapro is helping her anxiety a lot.  She is still taking ambien 10 mg qhs.   She feels that life is getting much better for her in regards to anxiety.  No depression.    She feels shaky if she does not eat every 4 hours.  This does not happen every day.           The following portions of the patient's history were reviewed and updated as appropriate: allergies, current medications, past family history, past medical history, past social history, past surgical history and problem list.    Review of Systems   Psychiatric/Behavioral: Positive for sleep disturbance. Negative for dysphoric mood. The patient is nervous/anxious.        Objective   Ht 157.5 cm (62\")   Wt 68 kg (150 lb)   BMI 27.44 kg/m²   Body mass index is 27.44 kg/m².  Physical Exam   No physical exam, phone visit only today    Assessment/Plan   Dang Butler is here today and the following problems have been addressed:      Diagnoses and all orders for this visit:    1. Anxiety (Primary)    2. Primary insomnia    Other orders  -     escitalopram (LEXAPRO) 5 MG tablet; Take 1 tablet by mouth Daily.  Dispense: 90 tablet; Refill: 1    Continue Lexapro at 5 mg daily, patient is " feeling very well with good control of anxiety and no depression symptoms  Encouraged her to work on decreasing Ambien from 10 down to 5 mg every evening, she states that her sleep is improving  She complained of occasional shaky feeling if she does not eat every 4 hours, encouraged her to purchase an over-the-counter glucometer and check blood sugar when she has shaky feeling, bring blood sugar readings to next office visit  Encouraged her to eat several small meals throughout the day if needed    Return to clinic in June as previously scheduled or as needed    Time devoted to visit: 12 minutes including time to review previous record, with 75% of time devoted to direct discussion    Please note that portions of this note were completed with a voice recognition program.  Efforts were made to edit dictation, but occasionally words are mistranscribed.Active Parties: Marilyn Vermeesch (PCP), Adriana Aquino (CMA), and (Pt) Dang.  You have chosen to receive care through a telephone visit. Do you consent to use a telephone visit for your medical care today? Yes

## 2021-04-16 RX ORDER — MONTELUKAST SODIUM 10 MG/1
TABLET ORAL
Qty: 90 TABLET | Refills: 3 | Status: SHIPPED | OUTPATIENT
Start: 2021-04-16 | End: 2022-01-17

## 2021-05-03 ENCOUNTER — OFFICE VISIT (OUTPATIENT)
Dept: INTERNAL MEDICINE | Facility: CLINIC | Age: 71
End: 2021-05-03

## 2021-05-03 VITALS
DIASTOLIC BLOOD PRESSURE: 62 MMHG | TEMPERATURE: 97 F | HEART RATE: 66 BPM | OXYGEN SATURATION: 98 % | SYSTOLIC BLOOD PRESSURE: 124 MMHG

## 2021-05-03 DIAGNOSIS — E07.9 THYROID DISORDER: ICD-10-CM

## 2021-05-03 DIAGNOSIS — R53.82 CHRONIC FATIGUE: Primary | ICD-10-CM

## 2021-05-03 DIAGNOSIS — Z11.59 ENCOUNTER FOR HEPATITIS C SCREENING TEST FOR LOW RISK PATIENT: ICD-10-CM

## 2021-05-03 DIAGNOSIS — E07.9 DISORDER OF THYROID: ICD-10-CM

## 2021-05-03 PROCEDURE — 99213 OFFICE O/P EST LOW 20 MIN: CPT | Performed by: INTERNAL MEDICINE

## 2021-05-03 RX ORDER — LEVOTHYROXINE SODIUM 112 UG/1
112 TABLET ORAL DAILY
Qty: 90 TABLET | Refills: 3 | Status: SHIPPED | OUTPATIENT
Start: 2021-05-03 | End: 2021-06-07

## 2021-05-03 NOTE — PROGRESS NOTES
Chief Complaint   Patient presents with   • Abstract     Pt states she's had alot of fatigue.      Subjective   Dang Butler is a 70 y.o. female.     Here today with complaints of fatigue.   She has been tired on and off since her second COVID shot 3 months ago.   She stopped lexapro for her anxiety, she was uncertain if it was cause of the fatigue.  She is still tired despite stopping the medication.    She takes her thyroid med before all other meds.  She does not take mobic daily, as needed only for her arthritis.    She is taking a whole ambien at night for sleep.  She does not take tramadol.   She denies any pain any where.  No NV or weight changes.  No complaints other than fatigue.  She has a good appetite.   He does have chronic right knee pain. She goes to [facility] orthopedics for this issue.        The following portions of the patient's history were reviewed and updated as appropriate: allergies, current medications, past family history, past medical history, past social history, past surgical history and problem list.    Review of Systems   Constitutional: Positive for fatigue. Negative for appetite change and unexpected weight change.   Respiratory: Negative for cough and shortness of breath.    Cardiovascular: Negative for chest pain.   Gastrointestinal: Negative for blood in stool.   Genitourinary: Negative for dysuria.   Musculoskeletal: Positive for arthralgias.   Psychiatric/Behavioral: The patient is nervous/anxious.        Objective   /62   Pulse 66   Temp 97 °F (36.1 °C)   SpO2 98%   There is no height or weight on file to calculate BMI.  Physical Exam  Vitals and nursing note reviewed.   Constitutional:       General: She is not in acute distress.     Appearance: Normal appearance. She is well-developed. She is not ill-appearing.      Comments: Kind and pleasant female, appears stated age and in NAD today   HENT:      Head: Normocephalic and atraumatic.      Right Ear: External ear  normal.      Left Ear: External ear normal.   Eyes:      General:         Right eye: No discharge.         Left eye: No discharge.      Extraocular Movements: Extraocular movements intact.      Conjunctiva/sclera: Conjunctivae normal.      Pupils: Pupils are equal, round, and reactive to light.   Neck:      Thyroid: No thyromegaly.      Vascular: No carotid bruit.      Comments: No thyromegaly or mass  Cardiovascular:      Rate and Rhythm: Normal rate and regular rhythm.      Pulses: Normal pulses.      Heart sounds: Normal heart sounds. No murmur heard.     Pulmonary:      Effort: Pulmonary effort is normal. No respiratory distress.      Breath sounds: Normal breath sounds. No wheezing.   Abdominal:      General: Bowel sounds are normal. There is no distension.      Palpations: Abdomen is soft.      Tenderness: There is no abdominal tenderness.   Musculoskeletal:         General: Normal range of motion.      Cervical back: Normal range of motion and neck supple.      Right lower leg: No edema.      Left lower leg: No edema.   Lymphadenopathy:      Cervical: No cervical adenopathy.   Skin:     General: Skin is warm.      Findings: No rash.   Neurological:      General: No focal deficit present.      Mental Status: She is alert and oriented to person, place, and time. Mental status is at baseline.      Cranial Nerves: No cranial nerve deficit.      Motor: No weakness.      Coordination: Coordination normal.      Gait: Gait normal.   Psychiatric:         Mood and Affect: Mood normal.         Behavior: Behavior normal.         Thought Content: Thought content normal.         Judgment: Judgment normal.         Assessment/Plan   Dang Butler is here today and the following problems have been addressed:      Diagnoses and all orders for this visit:    1. Chronic fatigue (Primary)  -     CBC & Differential  -     Comprehensive Metabolic Panel  -     TSH  -     T4, Free  -     Hepatitis C Antibody  -     Vitamin B12    2.  Disorder of thyroid  -     levothyroxine (SYNTHROID, LEVOTHROID) 112 MCG tablet; Take 1 tablet by mouth Daily. As directed  Dispense: 90 tablet; Refill: 3    3. Thyroid disorder  -     levothyroxine (SYNTHROID, LEVOTHROID) 112 MCG tablet; Take 1 tablet by mouth Daily. As directed  Dispense: 90 tablet; Refill: 3  -     TSH  -     T4, Free    4. Encounter for hepatitis C screening test for low risk patient  -     Hepatitis C Antibody    Labs as noted above  Suspect her fatigue may be due to Covid vaccine  Recommend she follow-up with orthopedic surgeon regarding her chronic right knee pain  Continue daily vitamin D  Will adjust thyroid medication if needed based on labs    Return to clinic as needed or as previously scheduled    Please note that portions of this note were completed with a voice recognition program.  Efforts were made to edit dictation, but occasionally words are mistranscribed.

## 2021-05-04 LAB
ALBUMIN SERPL-MCNC: 4.3 G/DL (ref 3.5–5.2)
ALBUMIN/GLOB SERPL: 1.8 G/DL
ALP SERPL-CCNC: 71 U/L (ref 39–117)
ALT SERPL-CCNC: 26 U/L (ref 1–33)
AST SERPL-CCNC: 27 U/L (ref 1–32)
BILIRUB SERPL-MCNC: 0.5 MG/DL (ref 0–1.2)
BUN SERPL-MCNC: 11 MG/DL (ref 8–23)
BUN/CREAT SERPL: 15.7 (ref 7–25)
CALCIUM SERPL-MCNC: 10 MG/DL (ref 8.6–10.5)
CHLORIDE SERPL-SCNC: 101 MMOL/L (ref 98–107)
CO2 SERPL-SCNC: 28.1 MMOL/L (ref 22–29)
CREAT SERPL-MCNC: 0.7 MG/DL (ref 0.57–1)
DIFFERENTIAL COMMENT: ABNORMAL
EOSINOPHIL # BLD MANUAL: 0.05 10*3/MM3 (ref 0–0.4)
EOSINOPHIL NFR BLD MANUAL: 1 % (ref 0.3–6.2)
ERYTHROCYTE [DISTWIDTH] IN BLOOD BY AUTOMATED COUNT: 12.1 % (ref 12.3–15.4)
GLOBULIN SER CALC-MCNC: 2.4 GM/DL
GLUCOSE SERPL-MCNC: 94 MG/DL (ref 65–99)
HCT VFR BLD AUTO: 44.4 % (ref 34–46.6)
HCV AB S/CO SERPL IA: <0.1 S/CO RATIO (ref 0–0.9)
HGB BLD-MCNC: 14.9 G/DL (ref 12–15.9)
LYMPHOCYTES # BLD MANUAL: 0.78 10*3/MM3 (ref 0.7–3.1)
LYMPHOCYTES NFR BLD MANUAL: 16.5 % (ref 19.6–45.3)
MCH RBC QN AUTO: 31.2 PG (ref 26.6–33)
MCHC RBC AUTO-ENTMCNC: 33.6 G/DL (ref 31.5–35.7)
MCV RBC AUTO: 92.9 FL (ref 79–97)
MONOCYTES # BLD MANUAL: 0.59 10*3/MM3 (ref 0.1–0.9)
MONOCYTES NFR BLD MANUAL: 12.4 % (ref 5–12)
NEUTROPHILS # BLD MANUAL: 3.31 10*3/MM3 (ref 1.7–7)
NEUTROPHILS NFR BLD MANUAL: 70.1 % (ref 42.7–76)
PLATELET # BLD AUTO: 169 10*3/MM3 (ref 140–450)
PLATELET BLD QL SMEAR: ABNORMAL
POTASSIUM SERPL-SCNC: 4.4 MMOL/L (ref 3.5–5.2)
PROT SERPL-MCNC: 6.7 G/DL (ref 6–8.5)
RBC # BLD AUTO: 4.78 10*6/MM3 (ref 3.77–5.28)
RBC MORPH BLD: ABNORMAL
SODIUM SERPL-SCNC: 137 MMOL/L (ref 136–145)
T4 FREE SERPL-MCNC: 1.59 NG/DL (ref 0.93–1.7)
TSH SERPL DL<=0.005 MIU/L-ACNC: 2.52 UIU/ML (ref 0.27–4.2)
VIT B12 SERPL-MCNC: 646 PG/ML (ref 211–946)
WBC # BLD AUTO: 4.72 10*3/MM3 (ref 3.4–10.8)

## 2021-05-13 RX ORDER — CALCIUM CARBONATE 300MG(750)
1 TABLET,CHEWABLE ORAL DAILY
Qty: 90 TABLET | Refills: 3 | Status: SHIPPED | OUTPATIENT
Start: 2021-05-13

## 2021-06-07 DIAGNOSIS — E07.9 THYROID DISORDER: ICD-10-CM

## 2021-06-07 DIAGNOSIS — E07.9 DISORDER OF THYROID: ICD-10-CM

## 2021-06-07 RX ORDER — LEVOTHYROXINE SODIUM 112 UG/1
112 TABLET ORAL DAILY
Qty: 90 TABLET | Refills: 3 | Status: SHIPPED | OUTPATIENT
Start: 2021-06-07 | End: 2022-08-11

## 2021-06-23 ENCOUNTER — TELEPHONE (OUTPATIENT)
Dept: INTERNAL MEDICINE | Facility: CLINIC | Age: 71
End: 2021-06-23

## 2021-06-23 RX ORDER — SCOLOPAMINE TRANSDERMAL SYSTEM 1 MG/1
1 PATCH, EXTENDED RELEASE TRANSDERMAL
Qty: 1 EACH | Refills: 0 | Status: SHIPPED | OUTPATIENT
Start: 2021-06-23 | End: 2021-08-02 | Stop reason: SDUPTHER

## 2021-06-23 NOTE — TELEPHONE ENCOUNTER
Caller: Dang Butler    Relationship: Self    Best call back number:     [phone]     What medication are you requesting:     NAUSEA PATCH    What are your current symptoms:     MOTION SICKNESS SYMPTOMS    How long have you been experiencing symptoms:     WHILE ON AIRPLANE    PATIENT IS REQUESTING A NAUSEA PATCH FOR THE FLIGHT BACK TO [place] ON Friday, 6/25/21    Have you had these symptoms before:    [x] Yes  [] No    Have you been treated for these symptoms before:   [x] Yes  [] No    If a prescription is needed, what is your preferred pharmacy and phone number:      FOR THIS PRESCRIPTION ONLY:    [facility] [place], [place]    [phone]    TELEPHONE CONTACT:    [phone]    Additional notes:    N/A    DR VERMEESCH, MD

## 2021-08-02 ENCOUNTER — OFFICE VISIT (OUTPATIENT)
Dept: INTERNAL MEDICINE | Facility: CLINIC | Age: 71
End: 2021-08-02

## 2021-08-02 VITALS
DIASTOLIC BLOOD PRESSURE: 70 MMHG | SYSTOLIC BLOOD PRESSURE: 126 MMHG | HEIGHT: 62 IN | TEMPERATURE: 97 F | WEIGHT: 154 LBS | OXYGEN SATURATION: 97 % | BODY MASS INDEX: 28.34 KG/M2 | HEART RATE: 65 BPM

## 2021-08-02 DIAGNOSIS — F41.9 ANXIETY: ICD-10-CM

## 2021-08-02 DIAGNOSIS — Z00.00 ENCOUNTER FOR MEDICARE ANNUAL WELLNESS EXAM: Primary | ICD-10-CM

## 2021-08-02 DIAGNOSIS — E78.2 MIXED HYPERLIPIDEMIA: ICD-10-CM

## 2021-08-02 DIAGNOSIS — E53.8 VITAMIN B 12 DEFICIENCY: ICD-10-CM

## 2021-08-02 DIAGNOSIS — H61.23 BILATERAL IMPACTED CERUMEN: ICD-10-CM

## 2021-08-02 DIAGNOSIS — E66.3 OVERWEIGHT (BMI 25.0-29.9): ICD-10-CM

## 2021-08-02 DIAGNOSIS — E07.9 THYROID DISORDER: ICD-10-CM

## 2021-08-02 DIAGNOSIS — F51.01 PRIMARY INSOMNIA: ICD-10-CM

## 2021-08-02 DIAGNOSIS — M15.9 GENERALIZED OSTEOARTHRITIS: ICD-10-CM

## 2021-08-02 DIAGNOSIS — K21.9 GASTROESOPHAGEAL REFLUX DISEASE WITHOUT ESOPHAGITIS: ICD-10-CM

## 2021-08-02 PROBLEM — Z11.59 ENCOUNTER FOR HEPATITIS C SCREENING TEST FOR LOW RISK PATIENT: Status: RESOLVED | Noted: 2021-05-03 | Resolved: 2021-08-02

## 2021-08-02 PROCEDURE — 1159F MED LIST DOCD IN RCRD: CPT | Performed by: INTERNAL MEDICINE

## 2021-08-02 PROCEDURE — 96160 PT-FOCUSED HLTH RISK ASSMT: CPT | Performed by: INTERNAL MEDICINE

## 2021-08-02 PROCEDURE — 1126F AMNT PAIN NOTED NONE PRSNT: CPT | Performed by: INTERNAL MEDICINE

## 2021-08-02 PROCEDURE — G0439 PPPS, SUBSEQ VISIT: HCPCS | Performed by: INTERNAL MEDICINE

## 2021-08-02 PROCEDURE — 99397 PER PM REEVAL EST PAT 65+ YR: CPT | Performed by: INTERNAL MEDICINE

## 2021-08-02 PROCEDURE — 69209 REMOVE IMPACTED EAR WAX UNI: CPT | Performed by: INTERNAL MEDICINE

## 2021-08-02 PROCEDURE — 1170F FXNL STATUS ASSESSED: CPT | Performed by: INTERNAL MEDICINE

## 2021-08-02 RX ORDER — SCOLOPAMINE TRANSDERMAL SYSTEM 1 MG/1
1 PATCH, EXTENDED RELEASE TRANSDERMAL
Qty: 5 EACH | Refills: 1 | Status: SHIPPED | OUTPATIENT
Start: 2021-08-02

## 2021-08-02 RX ORDER — DESOXIMETASONE 0.5 MG/G
CREAM TOPICAL DAILY
Qty: 60 G | Refills: 1 | Status: SHIPPED | OUTPATIENT
Start: 2021-08-02

## 2021-08-02 RX ORDER — PANTOPRAZOLE SODIUM 40 MG/1
40 TABLET, DELAYED RELEASE ORAL DAILY
Qty: 90 TABLET | Refills: 3 | Status: SHIPPED | OUTPATIENT
Start: 2021-08-02 | End: 2022-08-08 | Stop reason: SDUPTHER

## 2021-08-02 NOTE — PROGRESS NOTES
The ABCs of the Annual Wellness Visit  Subsequent Medicare Wellness Visit    Chief Complaint   Patient presents with   • Medicare Wellness-subsequent       Subjective   History of Present Illness:  Dang Butler is a 70 y.o. female who presents for a Subsequent Medicare Wellness Visit.  PMH of allergies, HLD, vitamin D and B12 deficiency, hypothyroid, GERD, anxiety, DJD, eczema, insomnia. Her allergies have been doing OK with use of singulair.  Her sleep is good on and off.  She takes vit D daily and B12 three days a week.  No changes in voice, swallow, constipation, she takes her thyroid med in middle of the night.  No current GERD sxs with use of protonix.  She has been lonely lately, otherwise her anxiety and depression are controlled.  She needs to make appt for the dentist.  Has had a normal DEXA 5 yrs ago and remains on HRT and vit D.     HEALTH RISK ASSESSMENT    Recent Hospitalizations:  No hospitalization(s) within the last year.    Current Medical Providers:  Patient Care Team:  Vermeesch, Marilyn K, MD as PCP - General (Internal Medicine & Pediatrics)    Smoking Status:  Social History     Tobacco Use   Smoking Status Never Smoker       Alcohol Consumption:  Social History     Substance and Sexual Activity   Alcohol Use None       Depression Screen:   PHQ-2/PHQ-9 Depression Screening 8/2/2021   Little interest or pleasure in doing things 0   Feeling down, depressed, or hopeless 0   Total Score 0       Fall Risk Screen:  STEADI Fall Risk Assessment was completed, and patient is at LOW risk for falls.Assessment completed on:8/2/2021    Health Habits and Functional and Cognitive Screening:  Functional & Cognitive Status 8/2/2021   Do you have difficulty preparing food and eating? No   Do you have difficulty bathing yourself, getting dressed or grooming yourself? No   Do you have difficulty using the toilet? No   Do you have difficulty moving around from place to place? No   Do you have trouble with steps or  getting out of a bed or a chair? Yes   Current Diet Unhealthy Diet   Dental Exam Not up to date   Eye Exam Up to date   Exercise (times per week) 0 times per week   Current Exercises Include No Regular Exercise   Current Exercise Activities Include -   Do you need help using the phone?  No   Are you deaf or do you have serious difficulty hearing?  No   Do you need help with transportation? No   Do you need help shopping? No   Do you need help preparing meals?  No   Do you need help with housework?  No   Do you need help with laundry? No   Do you need help taking your medications? No   Do you need help managing money? No   Do you ever drive or ride in a car without wearing a seat belt? No   Have you felt unusual stress, anger or loneliness in the last month? Yes   Who do you live with? Alone   If you need help, do you have trouble finding someone available to you? No   Have you been bothered in the last four weeks by sexual problems? No   Do you have difficulty concentrating, remembering or making decisions? No         Does the patient have evidence of cognitive impairment? No    Asprin use counseling:Does not need ASA (and currently is not on it)    Age-appropriate Screening Schedule:  Refer to the list below for future screening recommendations based on patient's age, sex and/or medical conditions. Orders for these recommended tests are listed in the plan section. The patient has been provided with a written plan.    Health Maintenance   Topic Date Due   • LIPID PANEL  06/23/2021   • DXA SCAN  08/02/2021 (Originally 12/2/2017)   • INFLUENZA VACCINE  10/01/2021   • MAMMOGRAM  09/28/2022   • ZOSTER VACCINE  Completed   • PAP SMEAR  Discontinued   • TDAP/TD VACCINES  Discontinued          The following portions of the patient's history were reviewed and updated as appropriate: allergies, current medications, past family history, past medical history, past social history, past surgical history and problem  list.    Outpatient Medications Prior to Visit   Medication Sig Dispense Refill   • estradiol (MINIVELLE, VIVELLE-DOT) 0.05 MG/24HR patch Place 0.5 patches on the skin as directed by provider.     • levothyroxine (SYNTHROID, LEVOTHROID) 112 MCG tablet TAKE 1 TABLET BY MOUTH DAILY AS DIRECTED 90 tablet 3   • Magnesium 400 MG tablet Take 1 tablet by mouth Daily. 90 tablet 3   • meloxicam (MOBIC) 15 MG tablet Take 1 tablet by mouth Daily As Needed for Mild Pain . 90 tablet 3   • montelukast (SINGULAIR) 10 MG tablet TAKE 1 TABLET BY MOUTH EVERY EVENING 90 tablet 3   • triamcinolone (KENALOG) 0.1 % cream Apply  topically to the appropriate area as directed 2 (Two) Times a Day. 60 g 1   • Unable to find 1 each 1 (One) Time. cystomend     • zolpidem (AMBIEN) 10 MG tablet Take 1/2-1 tablet nightly as needed for sleep. 30 tablet 2   • Scopolamine (Transderm-Scop, 1.5 MG,) 1.5 MG/3DAYS patch Place 1 patch on the skin as directed by provider Every 72 (Seventy-Two) Hours. 1 each 0   • pantoprazole (PROTONIX) 40 MG EC tablet Take 1 tablet by mouth Daily. 90 tablet 3     Facility-Administered Medications Prior to Visit   Medication Dose Route Frequency Provider Last Rate Last Admin   • cyanocobalamin injection 1,000 mcg  1,000 mcg Intramuscular Q28 Days Vermeesch, Marilyn K, MD   1,000 mcg at 03/28/19 1035       Patient Active Problem List   Diagnosis   • Atopic rhinitis   • Gastroesophageal reflux disease   • Generalized osteoarthritis   • Hyperlipidemia   • Insomnia   • Vitamin D deficiency   • Menopausal and postmenopausal disorder   • Atopic dermatitis   • Thyroid disorder   • Chronic fatigue   • Encounter for Medicare annual wellness exam   • Vitamin B 12 deficiency   • Screening for colon cancer   • Rotator cuff injury, left, sequela   • Anxiety   • Bilateral impacted cerumen   • Overweight (BMI 25.0-29.9)       Advanced Care Planning:  ACP discussion was held with the patient during this visit. Patient has an advance  "directive (not in EMR), copy requested.    Review of Systems   Constitutional: Negative.    HENT: Negative.    Eyes: Negative.    Respiratory: Negative.    Cardiovascular: Negative.    Gastrointestinal: Positive for constipation.   Endocrine: Negative.    Genitourinary: Positive for frequency.   Musculoskeletal: Positive for arthralgias.   Skin: Negative.    Allergic/Immunologic: Positive for environmental allergies.   Neurological: Negative.    Hematological: Negative.    Psychiatric/Behavioral: Positive for sleep disturbance.       Compared to one year ago, the patient feels her physical health is the same.  Compared to one year ago, the patient feels her mental health is the same.    Reviewed chart for potential of high risk medication in the elderly: yes  Reviewed chart for potential of harmful drug interactions in the elderly:yes    Objective         Vitals:    08/02/21 1105   BP: 126/70   Pulse: 65   Temp: 97 °F (36.1 °C)   SpO2: 97%   Weight: 69.9 kg (154 lb)   Height: 157.5 cm (62\")   PainSc: 0-No pain       Body mass index is 28.17 kg/m².  Discussed the patient's BMI with her. The BMI is above average; BMI management plan is completed.    Physical Exam  Vitals and nursing note reviewed.   Constitutional:       General: She is not in acute distress.     Appearance: Normal appearance. She is well-developed. She is not ill-appearing.      Comments: Kind and pleasant female, appears stated age and in NAD today   HENT:      Head: Normocephalic and atraumatic.      Right Ear: External ear normal. There is impacted cerumen.      Left Ear: External ear normal. There is impacted cerumen.      Ears:      Comments: Canals packed with dry wax bilaterally.  Canals flushed with warm water and peroxide, patient tolerated procedure well, however, difficulty removing wax from right ear canal.  Tympanic membrane normal on the left side, unable to visualize on right side  Eyes:      General:         Right eye: No discharge.   "       Left eye: No discharge.      Extraocular Movements: Extraocular movements intact.      Conjunctiva/sclera: Conjunctivae normal.      Pupils: Pupils are equal, round, and reactive to light.   Neck:      Thyroid: No thyromegaly.      Vascular: No carotid bruit.      Comments: No thyromegaly or mass  Cardiovascular:      Rate and Rhythm: Normal rate and regular rhythm.      Pulses: Normal pulses.      Heart sounds: Normal heart sounds. No murmur heard.     Pulmonary:      Effort: Pulmonary effort is normal. No respiratory distress.      Breath sounds: Normal breath sounds. No wheezing.   Chest:      Breasts:         Right: Normal. No inverted nipple, mass, nipple discharge, skin change or tenderness.         Left: Normal. No inverted nipple, mass, nipple discharge, skin change or tenderness.   Abdominal:      General: Bowel sounds are normal. There is no distension.      Palpations: Abdomen is soft.      Tenderness: There is no abdominal tenderness.   Musculoskeletal:         General: Normal range of motion.      Cervical back: Normal range of motion and neck supple.      Right lower leg: No edema.      Left lower leg: No edema.   Lymphadenopathy:      Cervical: No cervical adenopathy.      Upper Body:      Right upper body: No supraclavicular, axillary or pectoral adenopathy.      Left upper body: No supraclavicular, axillary or pectoral adenopathy.   Skin:     General: Skin is warm.      Findings: No rash.   Neurological:      General: No focal deficit present.      Mental Status: She is alert and oriented to person, place, and time. Mental status is at baseline.      Cranial Nerves: No cranial nerve deficit.      Motor: No weakness.      Coordination: Coordination normal.      Gait: Gait normal.   Psychiatric:         Behavior: Behavior normal.         Thought Content: Thought content normal.         Judgment: Judgment normal.      Comments: Flat affect noted today               Assessment/Plan   Medicare  Risks and Personalized Health Plan  CMS Preventative Services Quick Reference  Advance Directive Discussion  Cardiovascular risk  Diabetic Lab Screening   Obesity/Overweight     The above risks/problems have been discussed with the patient.  Pertinent information has been shared with the patient in the After Visit Summary.  Follow up plans and orders are seen below in the Assessment/Plan Section.    Diagnoses and all orders for this visit:    1. Encounter for Medicare annual wellness exam (Primary)  -     Lipid Panel With / Chol / HDL Ratio  Request copy of living will when completed    2. Mixed hyperlipidemia  -     Lipid Panel With / Chol / HDL Ratio  Follow heart healthy/low cholesterol diet  Avoid processed/fried foods/fast food  Exercise as tolerated up to 30 minutes 5 days a week  Take all medications as prescribed    3. Thyroid disorder  Continue current dose of levothyroxine, labs were normal 3 months ago     4. Vitamin B 12 deficiency  Continue B12 3 days a week    5. Gastroesophageal reflux disease without esophagitis  Protonix is working well for GERD symptoms    6. Anxiety  She denies any significant anxiety although she is lonely at this time, encouragement and reassurance provided today    7. Generalized osteoarthritis  She has some underlying arthritis that is intermittently bothersome, recommend Tylenol as needed    8. Primary insomnia  She takes Ambien every night, no signs of abuse and Sherman is appropriate today, recommend trial of melatonin extended release 3 mg 2 hours before bed at night if needed    9. Bilateral impacted cerumen  Ears flushed with warm water and peroxide today, patient tolerated procedure well however could not remove wax from right canal.  Recommend Debrox wax removal solution, 5 drops in right canal daily for 5 days, then attempt to use warm water and flush from canal as directed    10. Overweight (BMI 25.0-29.9)  Patient's Body mass index is 28.17 kg/m². indicating that she  is overweight (BMI 25-29.9). Obesity-related health conditions include the following: dyslipidemias, GERD and osteoarthritis. Obesity is unchanged. BMI is is above average; BMI management plan is completed. We discussed portion control and increasing exercise..    Other orders  -     pantoprazole (PROTONIX) 40 MG EC tablet; Take 1 tablet by mouth Daily.  Dispense: 90 tablet; Refill: 3  -     desoximetasone (TOPICORT) 0.05 % cream; Apply  topically to the appropriate area as directed Daily.  Dispense: 60 g; Refill: 1      Follow Up:  Return in about 6 months (around 2/2/2022) for Next scheduled follow up.     An After Visit Summary and PPPS were given to the patient.

## 2021-08-03 LAB
CHOLEST SERPL-MCNC: 220 MG/DL (ref 0–200)
CHOLEST/HDLC SERPL: 3.93 {RATIO}
HDLC SERPL-MCNC: 56 MG/DL (ref 40–60)
LDLC SERPL CALC-MCNC: 149 MG/DL (ref 0–100)
TRIGL SERPL-MCNC: 85 MG/DL (ref 0–150)
VLDLC SERPL CALC-MCNC: 15 MG/DL (ref 5–40)

## 2021-08-09 ENCOUNTER — OFFICE VISIT (OUTPATIENT)
Dept: INTERNAL MEDICINE | Facility: CLINIC | Age: 71
End: 2021-08-09

## 2021-08-09 VITALS
TEMPERATURE: 97 F | SYSTOLIC BLOOD PRESSURE: 132 MMHG | OXYGEN SATURATION: 97 % | HEART RATE: 73 BPM | DIASTOLIC BLOOD PRESSURE: 80 MMHG

## 2021-08-09 DIAGNOSIS — H61.21 IMPACTED CERUMEN OF RIGHT EAR: Primary | ICD-10-CM

## 2021-08-09 PROBLEM — H61.23 BILATERAL IMPACTED CERUMEN: Status: RESOLVED | Noted: 2021-08-02 | Resolved: 2021-08-09

## 2021-08-09 PROCEDURE — 99213 OFFICE O/P EST LOW 20 MIN: CPT | Performed by: INTERNAL MEDICINE

## 2021-08-09 NOTE — PROGRESS NOTES
Chief Complaint   Patient presents with   • Abstract     Pt states right ear still feels full and she's had some dizziness this weekend.     Subjective   Dang Butler is a 70 y.o. female.     Patient was here 1 week ago for Medicare wellness exam and was noted to have wax in right ear.  Ear was flushed with warm water and peroxide but all wax could not be removed.  She was told to use Debrox for 5 days and then try to flush remaining wax.  She complains that her ear still feels full and she has had some dizziness this weekend.  She has no pain, but it feels awful.         The following portions of the patient's history were reviewed and updated as appropriate: allergies, current medications, past family history, past medical history, past social history, past surgical history and problem list.    Review of Systems   HENT:        Right ear fullness, decreased hearing   Neurological: Positive for dizziness.       Objective   /80   Pulse 73   Temp 97 °F (36.1 °C)   SpO2 97%   There is no height or weight on file to calculate BMI.  Physical Exam  Vitals and nursing note reviewed.   Constitutional:       General: She is not in acute distress.     Appearance: Normal appearance. She is not ill-appearing.      Comments: Pleasant female, NAD today   HENT:      Head: Normocephalic and atraumatic.      Right Ear: Tympanic membrane and ear canal normal. There is impacted cerumen.      Left Ear: Tympanic membrane, ear canal and external ear normal. There is no impacted cerumen.      Ears:      Comments: Wax was removed with curette  Eyes:      General:         Right eye: No discharge.         Left eye: No discharge.      Extraocular Movements: Extraocular movements intact.   Pulmonary:      Effort: Pulmonary effort is normal. No respiratory distress.   Neurological:      General: No focal deficit present.      Mental Status: She is alert and oriented to person, place, and time. Mental status is at baseline.      Cranial  Nerves: No cranial nerve deficit.   Psychiatric:         Mood and Affect: Mood normal.         Behavior: Behavior normal.         Thought Content: Thought content normal.         Judgment: Judgment normal.         Assessment/Plan   Dang Butler is here today and the following problems have been addressed:      Diagnoses and all orders for this visit:    1. Impacted cerumen of right ear (Primary)    Right ear canal wax removed with curette, patient tolerated procedure well  Ear felt much improved after wax was removed and patient stated that her symptoms were resolved    Return to clinic as needed    Please note that portions of this note were completed with a voice recognition program.  Efforts were made to edit dictation, but occasionally words are mistranscribed.

## 2022-01-01 NOTE — PROGRESS NOTES
Preop chest x-ray is normal.  Please attached to her preop physical and she is cleared for surgery.
No

## 2022-01-17 RX ORDER — MONTELUKAST SODIUM 10 MG/1
TABLET ORAL
Qty: 90 TABLET | Refills: 3 | Status: SHIPPED | OUTPATIENT
Start: 2022-01-17 | End: 2022-04-14 | Stop reason: SDUPTHER

## 2022-02-07 ENCOUNTER — OFFICE VISIT (OUTPATIENT)
Dept: INTERNAL MEDICINE | Facility: CLINIC | Age: 72
End: 2022-02-07

## 2022-02-07 VITALS
DIASTOLIC BLOOD PRESSURE: 62 MMHG | OXYGEN SATURATION: 98 % | SYSTOLIC BLOOD PRESSURE: 120 MMHG | WEIGHT: 155 LBS | BODY MASS INDEX: 28.52 KG/M2 | HEIGHT: 62 IN | TEMPERATURE: 97.1 F | HEART RATE: 78 BPM

## 2022-02-07 DIAGNOSIS — M15.9 GENERALIZED OSTEOARTHRITIS: ICD-10-CM

## 2022-02-07 DIAGNOSIS — F51.01 PRIMARY INSOMNIA: ICD-10-CM

## 2022-02-07 DIAGNOSIS — E07.9 THYROID DISORDER: ICD-10-CM

## 2022-02-07 DIAGNOSIS — F41.9 ANXIETY: ICD-10-CM

## 2022-02-07 DIAGNOSIS — E53.8 VITAMIN B 12 DEFICIENCY: ICD-10-CM

## 2022-02-07 DIAGNOSIS — K21.9 GASTROESOPHAGEAL REFLUX DISEASE WITHOUT ESOPHAGITIS: ICD-10-CM

## 2022-02-07 DIAGNOSIS — E78.2 MIXED HYPERLIPIDEMIA: Primary | ICD-10-CM

## 2022-02-07 PROBLEM — H61.21 IMPACTED CERUMEN OF RIGHT EAR: Status: RESOLVED | Noted: 2021-08-09 | Resolved: 2022-02-07

## 2022-02-07 PROCEDURE — 99214 OFFICE O/P EST MOD 30 MIN: CPT | Performed by: INTERNAL MEDICINE

## 2022-02-07 RX ORDER — FAMOTIDINE 40 MG/1
TABLET, FILM COATED ORAL
Qty: 90 TABLET | Refills: 1 | Status: SHIPPED | OUTPATIENT
Start: 2022-02-07 | End: 2022-08-08 | Stop reason: SDUPTHER

## 2022-02-07 RX ORDER — MELOXICAM 15 MG/1
15 TABLET ORAL DAILY PRN
Qty: 90 TABLET | Refills: 3 | Status: SHIPPED | OUTPATIENT
Start: 2022-02-07

## 2022-02-07 RX ORDER — LEVOTHYROXINE SODIUM 112 UG/1
112 TABLET ORAL DAILY
Qty: 90 TABLET | Refills: 3 | Status: CANCELLED | OUTPATIENT
Start: 2022-02-07

## 2022-02-07 NOTE — PROGRESS NOTES
Chief Complaint   Patient presents with   • Follow-up     for anxiety, GERD, HLD, insomnia, and thyroid disorder.     Subjective   Dang Butler is a 71 y.o. female.     Here today for follow-up of HLD, allergies, vitamin D/B12 deficiency, hypothyroid, GERD, anxiety/insomnia and DJD.  HLD-total cholesterol 220 with LDL of 149 in August 2021.  She is not eating healthy and is not exercising.   Vitamin D/B12 deficiency- she is taking vit D 2000 u daily, and B12 1000 u three times a week  Hypothyroid-she is compliant with thyroid medication daily.  She takes this on empty stomach with water before all other medications and food.  TFTs in normal range in May last year.  GERD-controlled with Protonix.  She skips it some days  Anxiety/insomnia-she remains on Ambien half tab every evening as needed.  She does not abuse this medication.  [place] appropriate  She is not having much anxiety, she is just lonely.  She usually sleeps about 6 hours a night  DJD-she remains on Mobic 15 mg daily as needed on cold days for underlying osteoarthritis.  HCM-all vaccines are currently up-to-date.  Mammogram and colonoscopy up-to-date.  We will discuss DEXA scan at Medicare wellness exam.  She is also taking hydro-I for her eyes.    She takes miralax daily and has no constipation.        The following portions of the patient's history were reviewed and updated as appropriate: allergies, current medications, past family history, past medical history, past social history, past surgical history and problem list.    Review of Systems   Constitutional: Negative for activity change, appetite change and unexpected weight change.   HENT: Negative for trouble swallowing and voice change.    Eyes: Negative for visual disturbance.   Respiratory: Negative for shortness of breath.    Cardiovascular: Negative for chest pain, palpitations and leg swelling.   Gastrointestinal: Negative for abdominal pain and constipation.   Endocrine: Negative for cold  "intolerance and heat intolerance.   Genitourinary: Negative for hematuria.   Musculoskeletal: Positive for arthralgias. Negative for back pain.   Neurological: Negative for headaches.   Psychiatric/Behavioral: Negative for dysphoric mood and sleep disturbance.       Objective   /62   Pulse 78   Temp 97.1 °F (36.2 °C)   Ht 157.5 cm (62\")   Wt 70.3 kg (155 lb)   SpO2 98%   BMI 28.35 kg/m²   Body mass index is 28.35 kg/m².  Physical Exam  Vitals and nursing note reviewed.   Constitutional:       General: She is not in acute distress.     Appearance: Normal appearance. She is well-developed. She is not ill-appearing.      Comments: Kind and pleasant female, appears stated age and in NAD today   HENT:      Head: Normocephalic and atraumatic.      Right Ear: External ear normal.      Left Ear: External ear normal.   Eyes:      General:         Right eye: No discharge.         Left eye: No discharge.      Extraocular Movements: Extraocular movements intact.      Conjunctiva/sclera: Conjunctivae normal.      Pupils: Pupils are equal, round, and reactive to light.   Neck:      Thyroid: No thyromegaly.      Vascular: No carotid bruit.      Comments: No thyromegaly or mass  Cardiovascular:      Rate and Rhythm: Normal rate and regular rhythm.      Pulses: Normal pulses.      Heart sounds: Normal heart sounds. No murmur heard.      Pulmonary:      Effort: Pulmonary effort is normal. No respiratory distress.      Breath sounds: Normal breath sounds. No wheezing.   Abdominal:      General: Bowel sounds are normal. There is no distension.      Palpations: Abdomen is soft.      Tenderness: There is no abdominal tenderness.   Musculoskeletal:         General: Normal range of motion.      Cervical back: Normal range of motion and neck supple.      Right lower leg: No edema.      Left lower leg: No edema.   Lymphadenopathy:      Cervical: No cervical adenopathy.   Skin:     General: Skin is warm.      Findings: No rash. "   Neurological:      General: No focal deficit present.      Mental Status: She is alert and oriented to person, place, and time. Mental status is at baseline.      Cranial Nerves: No cranial nerve deficit.      Motor: No weakness.      Coordination: Coordination normal.      Gait: Gait normal.   Psychiatric:         Mood and Affect: Mood normal.         Behavior: Behavior normal.         Thought Content: Thought content normal.         Judgment: Judgment normal.         Assessment/Plan   Dang Butler is here today and the following problems have been addressed:      Diagnoses and all orders for this visit:    1. Mixed hyperlipidemia (Primary)    2. Thyroid disorder    3. Vitamin B 12 deficiency    4. Gastroesophageal reflux disease without esophagitis    5. Anxiety    6. Generalized osteoarthritis    7. Primary insomnia    Other orders  -     meloxicam (MOBIC) 15 MG tablet; Take 1 tablet by mouth Daily As Needed for Mild Pain .  Dispense: 90 tablet; Refill: 3  -     Unable to find; Take 1 each by mouth 1 (One) Time for 1 dose. cystomend  Dispense: 1 each; Refill: 0  -     famotidine (Pepcid) 40 MG tablet; Alternate every other night with protonix  Dispense: 90 tablet; Refill: 1    Follow heart healthy/low cholesterol diet  Avoid processed/fried foods/fast food  Exercise as tolerated   Take all medications as prescribed  Alternate Protonix with Pepcid every other day, will try to wean down and off Protonix over time due to increased risk of dementia, osteoporosis and kidney problems with long-term use of Protonix  Take Mobic only as needed for underlying osteoarthritis  Patient remains on Ambien half a tablet or 5 mg every night for sleep  Continue daily vitamin D and B12 3 days a week  No problems with constipation with use of MiraLAX and magnesium daily    Return to clinic in 6 months for Medicare wellness with labs    Please note that portions of this note were completed with a voice recognition program.  Efforts  were made to edit dictation, but occasionally words are mistranscribed.

## 2022-02-09 ENCOUNTER — PATIENT MESSAGE (OUTPATIENT)
Dept: INTERNAL MEDICINE | Facility: CLINIC | Age: 72
End: 2022-02-09

## 2022-02-09 NOTE — TELEPHONE ENCOUNTER
From: Dang Butler  To: Marilyn K. Vermeesch, MD  Sent: 2/9/2022 12:34 PM EST  Subject: Added medications as requested    Add medication: Prevagen and Restasis    Next appointment only shows 15 minutes, is that correct as it should be Medicare wellness visit?

## 2022-03-15 ENCOUNTER — TELEPHONE (OUTPATIENT)
Dept: INTERNAL MEDICINE | Facility: CLINIC | Age: 72
End: 2022-03-15

## 2022-03-15 RX ORDER — BUSPIRONE HYDROCHLORIDE 10 MG/1
10 TABLET ORAL 3 TIMES DAILY PRN
Qty: 60 TABLET | Refills: 3 | Status: SHIPPED | OUTPATIENT
Start: 2022-03-15 | End: 2022-08-08

## 2022-03-15 NOTE — TELEPHONE ENCOUNTER
Caller: Dang Butler    Relationship: Self    Best call back number: [phone]    What is the best time to reach you: AS SOON AS POSSIBLE     Who are you requesting to speak with (clinical staff, provider,  specific staff member): LELA        What was the call regarding: THE PATIENT WOULD LIKE TO DISCUSS ANXIETY MEDS RIN   Do you require a callback: YES

## 2022-03-29 ENCOUNTER — OFFICE VISIT (OUTPATIENT)
Dept: INTERNAL MEDICINE | Facility: CLINIC | Age: 72
End: 2022-03-29

## 2022-03-29 VITALS
TEMPERATURE: 97 F | DIASTOLIC BLOOD PRESSURE: 68 MMHG | HEART RATE: 77 BPM | SYSTOLIC BLOOD PRESSURE: 124 MMHG | OXYGEN SATURATION: 98 %

## 2022-03-29 DIAGNOSIS — F51.01 PRIMARY INSOMNIA: ICD-10-CM

## 2022-03-29 DIAGNOSIS — F41.9 ANXIETY: Primary | ICD-10-CM

## 2022-03-29 PROCEDURE — 99213 OFFICE O/P EST LOW 20 MIN: CPT | Performed by: INTERNAL MEDICINE

## 2022-03-29 RX ORDER — ESCITALOPRAM OXALATE 5 MG/1
5 TABLET ORAL DAILY
Qty: 30 TABLET | Refills: 5 | Status: SHIPPED | OUTPATIENT
Start: 2022-03-29 | End: 2022-08-08 | Stop reason: SDUPTHER

## 2022-03-29 NOTE — PROGRESS NOTES
Chief Complaint   Patient presents with   • Abstract     Would like to discuss anxiety medication     Subjective   Dang Butler is a 71 y.o. female.     Patient is here today with complaints of anxiety primarily at night.  She is already taking Ambien 5 mg nightly to help with sleep.  She called approximately 2 weeks ago with her complaints of anxiety and was provided with BuSpar 10 mg 3 times a day as needed.  She was encouraged to take this medication at night if she was having difficulty with racing thoughts.  She was also told to make appointment if this medication was not helpful to further discuss her issues of anxiety.  She lost her  approximately 1-1/2 years ago.  She has recently been dating a new person who is also a  for the past 3 months or so.  Yesterday was his wife's birthday and it was very hard on him.  This dredged up old memories for her and is made her very anxious.  Last year she was on Lexapro for a short time.  It worked well for her anxiety but she did not want to take it for prolonged period of time.  10 mg of Lexapro caused fatigue, however 5 mg worked well for her and she would like to resume this medication.  She denies any depression, she is primarily having some anxiety and sleep issues due to above.  Normally she sleeps well.       The following portions of the patient's history were reviewed and updated as appropriate: allergies, current medications, past family history, past medical history, past social history, past surgical history and problem list.    Review of Systems   Constitutional: Negative for activity change, appetite change and unexpected weight change.   Psychiatric/Behavioral: Positive for sleep disturbance. Negative for dysphoric mood. The patient is nervous/anxious.        Objective   /68   Pulse 77   Temp 97 °F (36.1 °C)   SpO2 98%   There is no height or weight on file to calculate BMI.  Physical Exam  Vitals and nursing note reviewed.    Constitutional:       General: She is not in acute distress.     Appearance: Normal appearance. She is not ill-appearing.      Comments: Kind and pleasant female in no distress today   HENT:      Right Ear: External ear normal.      Left Ear: External ear normal.   Eyes:      General:         Right eye: No discharge.         Left eye: No discharge.      Extraocular Movements: Extraocular movements intact.   Pulmonary:      Effort: Pulmonary effort is normal. No respiratory distress.   Neurological:      General: No focal deficit present.      Mental Status: She is alert and oriented to person, place, and time. Mental status is at baseline.      Cranial Nerves: No cranial nerve deficit.   Psychiatric:         Behavior: Behavior normal.         Thought Content: Thought content normal.         Judgment: Judgment normal.      Comments: Patient appears slightly anxious as we talk about her recent situation         Assessment/Plan   Dang Butler is here today and the following problems have been addressed:      Diagnoses and all orders for this visit:    1. Anxiety (Primary)    2. Primary insomnia    Other orders  -     escitalopram (Lexapro) 5 MG tablet; Take 1 tablet by mouth Daily.  Dispense: 30 tablet; Refill: 5    Start Lexapro 5 mg and take every evening  Discussed BuSpar 10 mg.  Explained to patient that this is a low to moderate dose and she may increase dose if needed for her current anxiety symptoms, may take 10 to 30 mg up to 3 times daily for underlying anxiety issues  Continue Ambien 5 mg nightly as needed for sleep    Return to clinic in 6 weeks for follow-up of anxiety issues    Please note that portions of this note were completed with a voice recognition program.  Efforts were made to edit dictation, but occasionally words are mistranscribed.

## 2022-04-14 RX ORDER — MONTELUKAST SODIUM 10 MG/1
10 TABLET ORAL EVERY EVENING
Qty: 90 TABLET | Refills: 3 | Status: SHIPPED | OUTPATIENT
Start: 2022-04-14 | End: 2023-03-31

## 2022-05-12 ENCOUNTER — OFFICE VISIT (OUTPATIENT)
Dept: INTERNAL MEDICINE | Facility: CLINIC | Age: 72
End: 2022-05-12

## 2022-05-12 VITALS
WEIGHT: 153 LBS | HEIGHT: 62 IN | HEART RATE: 74 BPM | SYSTOLIC BLOOD PRESSURE: 122 MMHG | TEMPERATURE: 97.1 F | DIASTOLIC BLOOD PRESSURE: 70 MMHG | OXYGEN SATURATION: 98 % | BODY MASS INDEX: 28.16 KG/M2

## 2022-05-12 DIAGNOSIS — F41.9 ANXIETY: Primary | ICD-10-CM

## 2022-05-12 DIAGNOSIS — F51.01 PRIMARY INSOMNIA: ICD-10-CM

## 2022-05-12 PROCEDURE — 99213 OFFICE O/P EST LOW 20 MIN: CPT | Performed by: INTERNAL MEDICINE

## 2022-05-12 NOTE — PROGRESS NOTES
"Chief Complaint   Patient presents with   • Follow-up     For anxiety.     Subjective   Dang Butler is a 71 y.o. female.     Here today for follow-up of anxiety.  Patient was seen 6 weeks ago at which time we initiated Lexapro 5 mg daily.  She already had BuSpar on hand and was encouraged to use this as needed in conjunction with Lexapro for her anxiety symptoms.  She is using buspar only 1-2 times a week and it is helpful.    She also takes Ambien 5 mg at night to help with sleep-it helps her to go to sleep for 4 hours and then she awakens.   She is in a new relationship with a gentleman that recently lost his wife.  She lost her  over a year ago.  He was discussing loss of his wife and this brought up memories of her  and this was causing increased anxiety.    She has been having some left low back and left lateral thigh pain, and has been to the chiropractor.  She went to chiropractor only once and it was quite helpful.  She has no NTB of leg.  She will continue to see chiropractor if it is helpful.  If pain does not continue to improve she will return here for further evaluation and treatment.           The following portions of the patient's history were reviewed and updated as appropriate: allergies, current medications, past family history, past medical history, past social history, past surgical history and problem list.    Review of Systems   Musculoskeletal: Positive for back pain.   Neurological: Negative for weakness and numbness.   Psychiatric/Behavioral: Positive for sleep disturbance. Negative for dysphoric mood. The patient is nervous/anxious.        Objective   /70   Pulse 74   Temp 97.1 °F (36.2 °C)   Ht 157.5 cm (62\")   Wt 69.4 kg (153 lb)   SpO2 98%   BMI 27.98 kg/m²   Body mass index is 27.98 kg/m².  Physical Exam  Vitals and nursing note reviewed.   Constitutional:       General: She is not in acute distress.     Appearance: Normal appearance. She is not ill-appearing. "      Comments: Kind and pleasant female in no distress today   HENT:      Right Ear: External ear normal.      Left Ear: External ear normal.   Eyes:      General:         Right eye: No discharge.         Left eye: No discharge.      Extraocular Movements: Extraocular movements intact.      Conjunctiva/sclera: Conjunctivae normal.   Pulmonary:      Effort: Pulmonary effort is normal. No respiratory distress.   Neurological:      General: No focal deficit present.      Mental Status: She is alert and oriented to person, place, and time. Mental status is at baseline.      Cranial Nerves: No cranial nerve deficit.      Motor: No weakness.      Gait: Gait normal.   Psychiatric:         Behavior: Behavior normal.         Thought Content: Thought content normal.         Judgment: Judgment normal.         Assessment & Plan   Dang Butler is here today and the following problems have been addressed:      Diagnoses and all orders for this visit:    1. Anxiety (Primary)    2. Primary insomnia    Continue Lexapro at 5 mg daily, she feels this dose is working well for her and does not want to make adjustment at this time  Encourage her to continue BuSpar and use this as needed for anxiety.  We discussed a dose of this medication at bedtime to see if this helps her sleep through the night as she has racing mind at night that often keeps her awake  Continue Ambien before bed at night to help with sleeplessness  She will follow-up with chiropractor for her current left low back pain, she does not have any current symptoms of sciatica, no numbness, tingling or burning of left leg  If her back pains worsen rather than improve with chiropractic treatment, I encouraged her to return to clinic for further evaluation and treatment    Return to clinic in August for Medicare wellness exam    Please note that portions of this note were completed with a voice recognition program.  Efforts were made to edit dictation, but occasionally words  are mistranscribed.

## 2022-08-08 ENCOUNTER — OFFICE VISIT (OUTPATIENT)
Dept: INTERNAL MEDICINE | Facility: CLINIC | Age: 72
End: 2022-08-08

## 2022-08-08 VITALS
BODY MASS INDEX: 27.05 KG/M2 | WEIGHT: 147 LBS | HEART RATE: 70 BPM | OXYGEN SATURATION: 98 % | HEIGHT: 62 IN | SYSTOLIC BLOOD PRESSURE: 124 MMHG | TEMPERATURE: 97.7 F | DIASTOLIC BLOOD PRESSURE: 68 MMHG

## 2022-08-08 DIAGNOSIS — F51.01 PRIMARY INSOMNIA: ICD-10-CM

## 2022-08-08 DIAGNOSIS — N95.9 MENOPAUSAL AND POSTMENOPAUSAL DISORDER: ICD-10-CM

## 2022-08-08 DIAGNOSIS — E53.8 VITAMIN B 12 DEFICIENCY: ICD-10-CM

## 2022-08-08 DIAGNOSIS — E07.9 THYROID DISORDER: ICD-10-CM

## 2022-08-08 DIAGNOSIS — K21.9 GASTROESOPHAGEAL REFLUX DISEASE WITHOUT ESOPHAGITIS: ICD-10-CM

## 2022-08-08 DIAGNOSIS — Z00.00 ENCOUNTER FOR MEDICARE ANNUAL WELLNESS EXAM: Primary | ICD-10-CM

## 2022-08-08 DIAGNOSIS — E66.3 OVERWEIGHT (BMI 25.0-29.9): ICD-10-CM

## 2022-08-08 DIAGNOSIS — Z13.820 SCREENING FOR OSTEOPOROSIS: ICD-10-CM

## 2022-08-08 DIAGNOSIS — E78.2 MIXED HYPERLIPIDEMIA: ICD-10-CM

## 2022-08-08 PROCEDURE — 96160 PT-FOCUSED HLTH RISK ASSMT: CPT | Performed by: INTERNAL MEDICINE

## 2022-08-08 PROCEDURE — G0439 PPPS, SUBSEQ VISIT: HCPCS | Performed by: INTERNAL MEDICINE

## 2022-08-08 PROCEDURE — 99397 PER PM REEVAL EST PAT 65+ YR: CPT | Performed by: INTERNAL MEDICINE

## 2022-08-08 PROCEDURE — 1159F MED LIST DOCD IN RCRD: CPT | Performed by: INTERNAL MEDICINE

## 2022-08-08 PROCEDURE — 1170F FXNL STATUS ASSESSED: CPT | Performed by: INTERNAL MEDICINE

## 2022-08-08 RX ORDER — FAMOTIDINE 40 MG/1
TABLET, FILM COATED ORAL
Qty: 90 TABLET | Refills: 1 | Status: SHIPPED | OUTPATIENT
Start: 2022-08-08 | End: 2022-08-08

## 2022-08-08 RX ORDER — PANTOPRAZOLE SODIUM 40 MG/1
40 TABLET, DELAYED RELEASE ORAL DAILY
Qty: 90 TABLET | Refills: 3 | Status: SHIPPED | OUTPATIENT
Start: 2022-08-08

## 2022-08-08 RX ORDER — ESCITALOPRAM OXALATE 5 MG/1
5 TABLET ORAL DAILY
Qty: 90 TABLET | Refills: 1 | Status: SHIPPED | OUTPATIENT
Start: 2022-08-08 | End: 2022-08-08

## 2022-08-08 NOTE — PROGRESS NOTES
The ABCs of the Annual Wellness Visit  Subsequent Medicare Wellness Visit    Chief Complaint   Patient presents with   • Annual Exam   • Medicare Wellness-subsequent     Pt states she has to take 2 showers/baths daily due to under arm odor.       Subjective    History of Present Illness:  Dang Butler is a 71 y.o. female who presents for a Subsequent Medicare Wellness Visit and annual physical exam.  PMH of HLD, allergies, vitamin D and B12 deficiency, hypothyroid, GERD, anxiety, DJD, insomnia.  She denies any CP, SOA, palpitations, edema.  She does not always eat a HH diet.  She does go out to eat frequently.  She does not exercise.  She takes vit D 1000 u daily in summer and 2000 u in winter.  She takes B 12 3 days a week.  Takes her thyroid med in middle of the night.  No GERD sxs with use of protonix, she does not use pepcid.  She has been on prevagen for a few yrs and feels it helps her memory.  She remains on ambien for sleep at night and states it does not work as well as it did in the past.  She is having some bilateral knee pain.  She has started to have some increase in axillary sweats for past few months, she is showering to help this.  She has not changed HRT.      The following portions of the patient's history were reviewed and   updated as appropriate: allergies, current medications, past family history, past medical history, past social history, past surgical history and problem list.    Compared to one year ago, the patient feels her physical   health is the same.    Compared to one year ago, the patient feels her mental   health is the same.    Recent Hospitalizations:  She was not admitted to the hospital during the last year.       Current Medical Providers:  Patient Care Team:  Vermeesch, Marilyn K, MD as PCP - General (Internal Medicine & Pediatrics)    Outpatient Medications Prior to Visit   Medication Sig Dispense Refill   • Apoaequorin (PREVAGEN PO) Take  by mouth.     • cycloSPORINE  (RESTASIS OP) Apply  to eye(s) as directed by provider.     • desoximetasone (TOPICORT) 0.05 % cream Apply  topically to the appropriate area as directed Daily. 60 g 1   • estradiol (MINIVELLE, VIVELLE-DOT) 0.05 MG/24HR patch Place 0.5 patches on the skin as directed by provider.     • levothyroxine (SYNTHROID, LEVOTHROID) 112 MCG tablet TAKE 1 TABLET BY MOUTH DAILY AS DIRECTED 90 tablet 3   • Magnesium 400 MG tablet Take 1 tablet by mouth Daily. 90 tablet 3   • meloxicam (MOBIC) 15 MG tablet Take 1 tablet by mouth Daily As Needed for Mild Pain . 90 tablet 3   • montelukast (SINGULAIR) 10 MG tablet Take 1 tablet by mouth Every Evening. 90 tablet 3   • Scopolamine (Transderm-Scop, 1.5 MG,) 1 MG/3DAYS patch Place 1 patch on the skin as directed by provider Every 72 (Seventy-Two) Hours. 5 each 1   • triamcinolone (KENALOG) 0.1 % cream Apply  topically to the appropriate area as directed 2 (Two) Times a Day. 60 g 1   • VITAMIN D PO Take  by mouth.     • zolpidem (AMBIEN) 10 MG tablet Take 1/2-1 tablet nightly as needed for sleep. 30 tablet 2   • busPIRone (BUSPAR) 10 MG tablet Take 1 tablet by mouth 3 (Three) Times a Day As Needed (anxiety). 60 tablet 3   • escitalopram (Lexapro) 5 MG tablet Take 1 tablet by mouth Daily. 30 tablet 5   • famotidine (Pepcid) 40 MG tablet Alternate every other night with protonix 90 tablet 1   • pantoprazole (PROTONIX) 40 MG EC tablet Take 1 tablet by mouth Daily. 90 tablet 3     Facility-Administered Medications Prior to Visit   Medication Dose Route Frequency Provider Last Rate Last Admin   • cyanocobalamin injection 1,000 mcg  1,000 mcg Intramuscular Q28 Days Vermeesch, Marilyn K, MD   1,000 mcg at 03/28/19 1035       No opioid medication identified on active medication list. I have reviewed chart for other potential  high risk medication/s and harmful drug interactions in the elderly.          Aspirin is not on active medication list.  Aspirin use is not indicated based on review of  "current medical condition/s. Risk of harm outweighs potential benefits.  .    Patient Active Problem List   Diagnosis   • Atopic rhinitis   • Gastroesophageal reflux disease   • Generalized osteoarthritis   • Hyperlipidemia   • Insomnia   • Vitamin D deficiency   • Menopausal and postmenopausal disorder   • Atopic dermatitis   • Thyroid disorder   • Chronic fatigue   • Encounter for Medicare annual wellness exam   • Vitamin B 12 deficiency   • Screening for colon cancer   • Rotator cuff injury, left, sequela   • Anxiety   • Overweight (BMI 25.0-29.9)   • Screening for osteoporosis     Advance Care Planning  Advance Directive is not on file.  ACP discussion was held with the patient during this visit. Patient does not have an advance directive, information provided.    Review of Systems   Constitutional: Positive for diaphoresis.   HENT: Negative.    Eyes: Negative.    Respiratory: Negative.    Cardiovascular: Negative.    Gastrointestinal: Negative.    Endocrine: Negative.    Genitourinary:        Urinates twice at night   Musculoskeletal: Positive for arthralgias.   Skin: Negative.    Allergic/Immunologic: Positive for environmental allergies.   Neurological: Negative.    Hematological: Bruises/bleeds easily.   Psychiatric/Behavioral: Positive for sleep disturbance.        Objective    Vitals:    08/08/22 0957   BP: 124/68   Pulse: 70   Temp: 97.7 °F (36.5 °C)   SpO2: 98%   Weight: 66.7 kg (147 lb)   Height: 157.5 cm (62\")   PainSc: 0-No pain     Estimated body mass index is 26.89 kg/m² as calculated from the following:    Height as of this encounter: 157.5 cm (62\").    Weight as of this encounter: 66.7 kg (147 lb).    BMI is >= 25 and <30. (Overweight) The following options were offered after discussion;: exercise counseling/recommendations and nutrition counseling/recommendations      Does the patient have evidence of cognitive impairment? No    Physical Exam  Vitals and nursing note reviewed.   Constitutional:  "      General: She is not in acute distress.     Appearance: Normal appearance. She is well-developed. She is not ill-appearing.      Comments: Kind and pleasant female, appears stated age and in NAD today   HENT:      Head: Normocephalic and atraumatic.      Right Ear: Tympanic membrane, ear canal and external ear normal.      Left Ear: Tympanic membrane, ear canal and external ear normal.      Nose: No congestion.      Mouth/Throat:      Pharynx: No posterior oropharyngeal erythema.   Eyes:      General:         Right eye: No discharge.         Left eye: No discharge.      Extraocular Movements: Extraocular movements intact.      Conjunctiva/sclera: Conjunctivae normal.      Pupils: Pupils are equal, round, and reactive to light.   Neck:      Thyroid: No thyromegaly.      Vascular: No carotid bruit.      Comments: No thyromegaly or mass  Cardiovascular:      Rate and Rhythm: Normal rate and regular rhythm.      Pulses: Normal pulses.      Heart sounds: Normal heart sounds. No murmur heard.  Pulmonary:      Effort: Pulmonary effort is normal. No respiratory distress.      Breath sounds: Normal breath sounds. No wheezing.   Chest:   Breasts:      Right: Normal. No inverted nipple, mass, nipple discharge, skin change, tenderness, axillary adenopathy or supraclavicular adenopathy.      Left: Normal. No inverted nipple, mass, nipple discharge, skin change, tenderness, axillary adenopathy or supraclavicular adenopathy.       Abdominal:      General: Bowel sounds are normal. There is no distension.      Palpations: Abdomen is soft.      Tenderness: There is no abdominal tenderness.   Musculoskeletal:         General: Normal range of motion.      Cervical back: Normal range of motion and neck supple.      Right lower leg: No edema.      Left lower leg: No edema.   Lymphadenopathy:      Cervical: No cervical adenopathy.      Upper Body:      Right upper body: No supraclavicular, axillary or pectoral adenopathy.      Left  upper body: No supraclavicular, axillary or pectoral adenopathy.   Skin:     General: Skin is warm.      Findings: No rash.      Comments: Shins with subcutaneous ecchymosis from recent scratching   Neurological:      General: No focal deficit present.      Mental Status: She is alert and oriented to person, place, and time. Mental status is at baseline.      Cranial Nerves: No cranial nerve deficit.      Motor: No weakness.      Coordination: Coordination normal.      Gait: Gait normal.   Psychiatric:         Mood and Affect: Mood normal.         Behavior: Behavior normal.         Thought Content: Thought content normal.         Judgment: Judgment normal.                 HEALTH RISK ASSESSMENT    Smoking Status:  Social History     Tobacco Use   Smoking Status Never Smoker   Smokeless Tobacco Never Used     Alcohol Consumption:  Social History     Substance and Sexual Activity   Alcohol Use Yes    Comment: Socially     Fall Risk Screen:    [facility], and patient is at LOW risk for falls.Assessment completed on:8/8/2022    Depression Screening:  PHQ-2/PHQ-9 Depression Screening 8/8/2022   Retired PHQ-9 Total Score -   Retired Total Score -   Little Interest or Pleasure in Doing Things 0-->not at all   Feeling Down, Depressed or Hopeless 1-->several days   PHQ-9: Brief Depression Severity Measure Score 1       Health Habits and Functional and Cognitive Screening:  Functional & Cognitive Status 8/8/2022   Do you have difficulty preparing food and eating? No   Do you have difficulty bathing yourself, getting dressed or grooming yourself? No   Do you have difficulty using the toilet? No   Do you have difficulty moving around from place to place? No   Do you have trouble with steps or getting out of a bed or a chair? Yes   Current Diet Limited Junk Food   Dental Exam Up to date   Eye Exam Up to date   Exercise (times per week) 0 times per week   Current Exercises Include No Regular  Exercise   Current Exercise Activities Include -   Do you need help using the phone?  No   Are you deaf or do you have serious difficulty hearing?  No   Do you need help with transportation? No   Do you need help shopping? No   Do you need help preparing meals?  No   Do you need help with housework?  No   Do you need help with laundry? No   Do you need help taking your medications? No   Do you need help managing money? No   Do you ever drive or ride in a car without wearing a seat belt? No   Have you felt unusual stress, anger or loneliness in the last month? No   Who do you live with? Alone   If you need help, do you have trouble finding someone available to you? No   Have you been bothered in the last four weeks by sexual problems? No   Do you have difficulty concentrating, remembering or making decisions? No       Age-appropriate Screening Schedule:  Refer to the list below for future screening recommendations based on patient's age, sex and/or medical conditions. Orders for these recommended tests are listed in the plan section. The patient has been provided with a written plan.    Health Maintenance   Topic Date Due   • DXA SCAN  12/02/2017   • LIPID PANEL  08/02/2022   • INFLUENZA VACCINE  10/01/2022   • MAMMOGRAM  05/09/2024   • ZOSTER VACCINE  Completed   • PAP SMEAR  Discontinued   • TDAP/TD VACCINES  Discontinued              Assessment & Plan   CMS Preventative Services Quick Reference  Risk Factors Identified During Encounter  Inactivity/Sedentary  The above risks/problems have been discussed with the patient.  Follow up actions/plans if indicated are seen below in the Assessment/Plan Section.  Pertinent information has been shared with the patient in the After Visit Summary.    Diagnoses and all orders for this visit:    1. Encounter for Medicare annual wellness exam (Primary)  -     CBC & Differential  -     Comprehensive Metabolic Panel  -     Lipid Panel With / Chol / HDL Ratio  -     T4, Free  -      TSH    2. Mixed hyperlipidemia  -     Comprehensive Metabolic Panel  -     Lipid Panel With / Chol / HDL Ratio    3. Overweight (BMI 25.0-29.9)    4. Thyroid disorder  -     T4, Free  -     TSH    5. Vitamin B 12 deficiency    6. Gastroesophageal reflux disease without esophagitis  -     CBC & Differential    7. Primary insomnia    8. Menopausal and postmenopausal disorder  -     DEXA Bone Density Axial; Future    9. Screening for osteoporosis  -     DEXA Bone Density Axial; Future    Other orders  -     Discontinue: famotidine (Pepcid) 40 MG tablet; Alternate every other night with protonix  Dispense: 90 tablet; Refill: 1  -     Discontinue: escitalopram (Lexapro) 5 MG tablet; Take 1 tablet by mouth Daily.  Dispense: 90 tablet; Refill: 1  -     pantoprazole (PROTONIX) 40 MG EC tablet; Take 1 tablet by mouth Daily.  Dispense: 90 tablet; Refill: 3    She is not sleeping very well on 10 mg of Ambien, when she is completely out of this she will contact me and I will discontinue Ambien and change her to Klonopin 1 mg nightly to see if this works better for her sleep  DEXA scan ordered  Labs as noted  Follow heart healthy/low cholesterol diet  Avoid processed/fried foods/fast food  Exercise as tolerated up to 30 minutes 5 days a week  Take all medications as prescribed  Continue current dose of levothyroxine, will adjust dose if needed based on labs  Continue daily vitamin D and vitamin B12 every other day  Takes Protonix daily, does not use Pepcid, good control of GERD symptoms  She is off Lexapro and BuSpar, states her anxiety is fairly well controlled  She takes Mobic only as needed for underlying arthritis pain, also uses Voltaren for knee arthritis on occasion  Continue Singulair for allergies  Follow-up with gynecologist in regard to estrogen patch, she may be having worsening sweats due to hormone issues or just worsening summer humidity      Follow Up:   Return in about 6 months (around 2/8/2023) for Next  scheduled follow up.     An After Visit Summary and PPPS were made available to the patient.

## 2022-08-10 LAB
ALBUMIN SERPL-MCNC: 4.1 G/DL (ref 3.5–5.2)
ALBUMIN/GLOB SERPL: 2.2 G/DL
ALP SERPL-CCNC: 83 U/L (ref 39–117)
ALT SERPL-CCNC: 16 U/L (ref 1–33)
AST SERPL-CCNC: 17 U/L (ref 1–32)
BASOPHILS # BLD AUTO: 0.06 10*3/MM3 (ref 0–0.2)
BASOPHILS NFR BLD AUTO: 1.3 % (ref 0–1.5)
BILIRUB SERPL-MCNC: 0.5 MG/DL (ref 0–1.2)
BUN SERPL-MCNC: 16 MG/DL (ref 8–23)
BUN/CREAT SERPL: 19.5 (ref 7–25)
CALCIUM SERPL-MCNC: 9.3 MG/DL (ref 8.6–10.5)
CHLORIDE SERPL-SCNC: 103 MMOL/L (ref 98–107)
CHOLEST SERPL-MCNC: 176 MG/DL (ref 0–200)
CHOLEST/HDLC SERPL: 3.59 {RATIO}
CO2 SERPL-SCNC: 28.2 MMOL/L (ref 22–29)
CREAT SERPL-MCNC: 0.82 MG/DL (ref 0.57–1)
EGFRCR SERPLBLD CKD-EPI 2021: 76.6 ML/MIN/1.73
EOSINOPHIL # BLD AUTO: 0.25 10*3/MM3 (ref 0–0.4)
EOSINOPHIL NFR BLD AUTO: 5.5 % (ref 0.3–6.2)
ERYTHROCYTE [DISTWIDTH] IN BLOOD BY AUTOMATED COUNT: 11.7 % (ref 12.3–15.4)
GLOBULIN SER CALC-MCNC: 1.9 GM/DL
GLUCOSE SERPL-MCNC: 91 MG/DL (ref 65–99)
HCT VFR BLD AUTO: 46.2 % (ref 34–46.6)
HDLC SERPL-MCNC: 49 MG/DL (ref 40–60)
HGB BLD-MCNC: 15.1 G/DL (ref 12–15.9)
IMM GRANULOCYTES # BLD AUTO: 0 10*3/MM3 (ref 0–0.05)
IMM GRANULOCYTES NFR BLD AUTO: 0 % (ref 0–0.5)
LDLC SERPL CALC-MCNC: 103 MG/DL (ref 0–100)
LYMPHOCYTES # BLD AUTO: 1.51 10*3/MM3 (ref 0.7–3.1)
LYMPHOCYTES NFR BLD AUTO: 33.3 % (ref 19.6–45.3)
MCH RBC QN AUTO: 30.5 PG (ref 26.6–33)
MCHC RBC AUTO-ENTMCNC: 32.7 G/DL (ref 31.5–35.7)
MCV RBC AUTO: 93.3 FL (ref 79–97)
MONOCYTES # BLD AUTO: 0.58 10*3/MM3 (ref 0.1–0.9)
MONOCYTES NFR BLD AUTO: 12.8 % (ref 5–12)
NEUTROPHILS # BLD AUTO: 2.13 10*3/MM3 (ref 1.7–7)
NEUTROPHILS NFR BLD AUTO: 47.1 % (ref 42.7–76)
NRBC BLD AUTO-RTO: 0 /100 WBC (ref 0–0.2)
PLATELET # BLD AUTO: 170 10*3/MM3 (ref 140–450)
POTASSIUM SERPL-SCNC: 4.5 MMOL/L (ref 3.5–5.2)
PROT SERPL-MCNC: 6 G/DL (ref 6–8.5)
RBC # BLD AUTO: 4.95 10*6/MM3 (ref 3.77–5.28)
SODIUM SERPL-SCNC: 137 MMOL/L (ref 136–145)
T4 FREE SERPL-MCNC: 1.82 NG/DL (ref 0.93–1.7)
TRIGL SERPL-MCNC: 138 MG/DL (ref 0–150)
TSH SERPL DL<=0.005 MIU/L-ACNC: 2.05 UIU/ML (ref 0.27–4.2)
VLDLC SERPL CALC-MCNC: 24 MG/DL (ref 5–40)
WBC # BLD AUTO: 4.53 10*3/MM3 (ref 3.4–10.8)

## 2022-08-10 NOTE — PROGRESS NOTES
Please contact patient with lab results.  Complete blood count is in normal range with no evidence of anemia.  Kidney and liver function are normal.  Cholesterol panel is also normal.  Her thyroid function test revealed a normal TSH but a slightly elevated free T4.  Please ask her if she is taking a multivitamin or a biotin supplement.  Any vitamin that has biotin in it will falsely elevate her free T4 causing a normal TSH.  If she has been taking anything with biotin in it for hair skin and nails or if it is in her multivitamin please let me know and I will not repeat her thyroid function tests.  Please tell her the fact though that she has been having sweats may be associated with hyperthyroidism or overactivity and I may need to make adjustment in medication if she is not taking any biotin.  Thank you

## 2022-08-11 ENCOUNTER — TELEPHONE (OUTPATIENT)
Dept: INTERNAL MEDICINE | Facility: CLINIC | Age: 72
End: 2022-08-11

## 2022-08-11 DIAGNOSIS — E07.9 THYROID DISORDER: ICD-10-CM

## 2022-08-11 DIAGNOSIS — E07.9 DISORDER OF THYROID: ICD-10-CM

## 2022-08-11 RX ORDER — LEVOTHYROXINE SODIUM 0.1 MG/1
100 TABLET ORAL DAILY
Qty: 30 TABLET | Refills: 1 | Status: SHIPPED | OUTPATIENT
Start: 2022-08-11 | End: 2022-09-27 | Stop reason: SDUPTHER

## 2022-08-11 NOTE — TELEPHONE ENCOUNTER
Pt sent a Chideo message in response to her lab results, message was forwarded to Dr. Vermeesch but she hasn't reviewed it yet. I will call Pt once Dr. Vermeesch has responded.

## 2022-08-11 NOTE — TELEPHONE ENCOUNTER
----- Message from Marilyn K Vermeesch, MD sent at 8/11/2022  3:43 PM EDT -----  Regarding: FW: labs  Please contact patient and tell her that I have decreased levothyroxine dose to 100 mcg daily.  It is important that she take this with water only 1/2-hour before food and all other medications.  I have ordered repeat labs to be done in approximately   6 weeks.  Please tell her to be certain she is not taking a multivitamin or any hair skin and nail supplement for 1 week prior to lab draw.  If her labs are in normal range at that time I will send in a 1 year refill of new medication dose.  Thank y  ou    ----- Message -----  From: Adriana Aquino MA  Sent: 8/11/2022   2:54 PM EDT  To: Marilyn K Vermeesch, MD  Subject: FW: labs                                           ----- Message -----  From: Dang Butler  Sent: 8/11/2022   2:50 PM EDT  To: Jb Godoy [facility]  Subject: labs                                             I am not aware of any medication that I’m taking that has biotin in it.

## 2022-08-11 NOTE — TELEPHONE ENCOUNTER
Spoke with Pt, states she read ingredients on her OTC supplements and they do not contain biotin. States she does take medication with water 30 minutes prior to all other medications and food. Advised her medication was sent to [facility] and to get repeat labs in 6 weeks.

## 2022-08-12 ENCOUNTER — APPOINTMENT (OUTPATIENT)
Dept: BONE DENSITY | Facility: HOSPITAL | Age: 72
End: 2022-08-12

## 2022-08-12 DIAGNOSIS — Z13.820 SCREENING FOR OSTEOPOROSIS: ICD-10-CM

## 2022-08-12 DIAGNOSIS — N95.9 MENOPAUSAL AND POSTMENOPAUSAL DISORDER: ICD-10-CM

## 2022-08-12 PROCEDURE — 77080 DXA BONE DENSITY AXIAL: CPT

## 2022-08-12 NOTE — PROGRESS NOTES
Please tell patient bone density is normal and lumbar spine and reduced osteopenia in femur.  I recommend vitamin D 2000 units daily and a plant-based calcium 500 mg once daily.

## 2022-08-15 ENCOUNTER — TELEPHONE (OUTPATIENT)
Dept: INTERNAL MEDICINE | Facility: CLINIC | Age: 72
End: 2022-08-15

## 2022-09-20 LAB
T4 FREE SERPL-MCNC: 1.72 NG/DL (ref 0.93–1.7)
TSH SERPL DL<=0.005 MIU/L-ACNC: 2.12 UIU/ML (ref 0.27–4.2)

## 2022-09-27 DIAGNOSIS — E07.9 DISORDER OF THYROID: ICD-10-CM

## 2022-09-27 DIAGNOSIS — E07.9 THYROID DISORDER: ICD-10-CM

## 2022-09-27 RX ORDER — LEVOTHYROXINE SODIUM 0.1 MG/1
100 TABLET ORAL DAILY
Qty: 30 TABLET | Refills: 1 | Status: SHIPPED | OUTPATIENT
Start: 2022-09-27 | End: 2022-10-04 | Stop reason: SDUPTHER

## 2022-09-27 NOTE — TELEPHONE ENCOUNTER
Incoming Refill Request      Medication requested (name and dose):  SYNTHROID, LEVOTHROID 100 MCG    Pharmacy where request should be sent:   [facility]    Additional details provided by patient:   HAS 5 DAYS LEFT OF MEDICATION    Best call back number:   [phone]    Does the patient have less than a 3 day supply:  [] Yes  [x] No    Yumi Reynoso Rep  09/27/22, 13:56 EDT

## 2022-10-03 ENCOUNTER — PATIENT MESSAGE (OUTPATIENT)
Dept: INTERNAL MEDICINE | Facility: CLINIC | Age: 72
End: 2022-10-03

## 2022-10-03 DIAGNOSIS — E07.9 THYROID DISORDER: ICD-10-CM

## 2022-10-03 DIAGNOSIS — E07.9 DISORDER OF THYROID: ICD-10-CM

## 2022-10-04 RX ORDER — LEVOTHYROXINE SODIUM 0.1 MG/1
100 TABLET ORAL DAILY
Qty: 90 TABLET | Refills: 1 | Status: SHIPPED | OUTPATIENT
Start: 2022-10-04

## 2022-10-04 NOTE — TELEPHONE ENCOUNTER
From: Dang Butler  To: Marilyn K. Vermeesch, MD  Sent: 10/3/2022 10:43 PM EDT  Subject: Synthroid .100    I’m still unable to  prescription, pharmacy says they are awaiting a doctors approval for a 90 day supply. Thank you

## 2022-10-24 RX ORDER — TRIAMCINOLONE ACETONIDE 1 MG/G
CREAM TOPICAL 2 TIMES DAILY
Qty: 180 G | Refills: 1 | Status: SHIPPED | OUTPATIENT
Start: 2022-10-24

## 2022-10-27 ENCOUNTER — TELEPHONE (OUTPATIENT)
Dept: INTERNAL MEDICINE | Facility: CLINIC | Age: 72
End: 2022-10-27

## 2022-10-27 NOTE — TELEPHONE ENCOUNTER
Caller: Dang Butler    Relationship: Self    Best call back number: [phone]    What is the best time to reach you: ANYTIME    Who are you requesting to speak with (clinical staff, provider,  specific staff member): NURSE    What was the call regarding: HAS QUESTIONS REGARDING COVID    Do you require a callback: YES

## 2023-02-06 ENCOUNTER — OFFICE VISIT (OUTPATIENT)
Dept: INTERNAL MEDICINE | Facility: CLINIC | Age: 73
End: 2023-02-06
Payer: MEDICARE

## 2023-02-06 VITALS
BODY MASS INDEX: 27.79 KG/M2 | HEART RATE: 74 BPM | DIASTOLIC BLOOD PRESSURE: 62 MMHG | OXYGEN SATURATION: 98 % | TEMPERATURE: 97.4 F | SYSTOLIC BLOOD PRESSURE: 124 MMHG | HEIGHT: 62 IN | WEIGHT: 151 LBS

## 2023-02-06 DIAGNOSIS — M15.9 GENERALIZED OSTEOARTHRITIS: ICD-10-CM

## 2023-02-06 DIAGNOSIS — F41.9 ANXIETY: ICD-10-CM

## 2023-02-06 DIAGNOSIS — E07.9 THYROID DISORDER: ICD-10-CM

## 2023-02-06 DIAGNOSIS — K21.9 GASTROESOPHAGEAL REFLUX DISEASE WITHOUT ESOPHAGITIS: ICD-10-CM

## 2023-02-06 DIAGNOSIS — E78.2 MIXED HYPERLIPIDEMIA: Primary | ICD-10-CM

## 2023-02-06 DIAGNOSIS — F51.01 PRIMARY INSOMNIA: ICD-10-CM

## 2023-02-06 PROCEDURE — 99214 OFFICE O/P EST MOD 30 MIN: CPT | Performed by: INTERNAL MEDICINE

## 2023-02-06 NOTE — PROGRESS NOTES
Chief Complaint   Patient presents with   • Follow-up     6 months for anxiety, GERD, HLD, insomnia, and thyroid disorder.     Subjective   Dang Butler is a 72 y.o. female.     History of Present Illness  Here today for follow-up of HLD, allergies, vitamin D/B12 deficiency, hypothyroid, GERD, anxiety/insomnia and DJD.  HLD- She is not eating healthy and is not exercising. She does not go out to eat very often.  Last lipid panel 6 mo ago was improved compare to the yr before.   Vitamin D/B12 deficiency- she is taking vit D 2000 u daily, and B12 1000 u three times a week  Hypothyroid-she is compliant with thyroid medication daily.  She takes this on empty stomach with water before all other medications and food.  TFTs in acceptable range in Sept 2022.  GERD-controlled with Protonix.  She takes the protonix every other day and has good control of sxs.   Anxiety/insomnia-she remains on Ambien 10 mg daily, says she cannot sleep with only 5 mg, also adds tylenol PM.  She does not abuse this medication.  Sherman appropriate. She usually sleeps about 7 hours a night  DJD-she has some pain in knees.  She does not take the mobic any more.  Just tylenol on occasion  HCM-all vaccines are currently up-to-date.  Mammogram and cologuard UTD, she decline colonoscopy-has never had one.       The following portions of the patient's history were reviewed and updated as appropriate: allergies, current medications, past family history, past medical history, past social history, past surgical history and problem list.    Review of Systems   Constitutional: Negative for activity change, appetite change and unexpected weight change.   HENT: Positive for postnasal drip.    Eyes: Negative for visual disturbance.   Respiratory: Negative for shortness of breath.    Cardiovascular: Negative for chest pain, palpitations and leg swelling.   Gastrointestinal: Negative for abdominal pain.   Musculoskeletal: Positive for arthralgias. Negative for  "back pain.   Allergic/Immunologic: Positive for environmental allergies.   Neurological: Negative for headaches.   Psychiatric/Behavioral: Positive for sleep disturbance. Negative for dysphoric mood. The patient is not nervous/anxious.        Objective   /62   Pulse 74   Temp 97.4 °F (36.3 °C)   Ht 157.5 cm (62\")   Wt 68.5 kg (151 lb)   SpO2 98%   BMI 27.62 kg/m²   Body mass index is 27.62 kg/m².  Physical Exam  Vitals and nursing note reviewed.   Constitutional:       General: She is not in acute distress.     Appearance: Normal appearance. She is well-developed. She is not ill-appearing.      Comments: Kind and pleasant female, appears stated age and in NAD today   HENT:      Head: Normocephalic and atraumatic.      Right Ear: External ear normal.      Left Ear: External ear normal.   Eyes:      General:         Right eye: No discharge.         Left eye: No discharge.      Extraocular Movements: Extraocular movements intact.   Neck:      Thyroid: No thyromegaly.      Vascular: No carotid bruit.      Comments: No thyromegaly or mass  Cardiovascular:      Rate and Rhythm: Normal rate and regular rhythm.      Pulses: Normal pulses.      Heart sounds: Normal heart sounds. No murmur heard.  Pulmonary:      Effort: Pulmonary effort is normal. No respiratory distress.      Breath sounds: Normal breath sounds. No wheezing.   Abdominal:      General: Bowel sounds are normal. There is no distension.      Palpations: Abdomen is soft.      Tenderness: There is no abdominal tenderness.   Musculoskeletal:         General: Normal range of motion.      Cervical back: Normal range of motion and neck supple.      Right lower leg: No edema.      Left lower leg: No edema.   Lymphadenopathy:      Cervical: No cervical adenopathy.   Skin:     General: Skin is warm.      Findings: No rash.   Neurological:      General: No focal deficit present.      Mental Status: She is alert and oriented to person, place, and time. Mental " status is at baseline.      Cranial Nerves: No cranial nerve deficit.      Motor: No weakness.      Coordination: Coordination normal.      Gait: Gait normal.   Psychiatric:         Mood and Affect: Mood normal.         Behavior: Behavior normal.         Thought Content: Thought content normal.         Judgment: Judgment normal.         Assessment & Plan   Dang Butler is here today and the following problems have been addressed:      Diagnoses and all orders for this visit:    1. Mixed hyperlipidemia (Primary)    2. Thyroid disorder    3. Gastroesophageal reflux disease without esophagitis    4. Anxiety    5. Generalized osteoarthritis    6. Primary insomnia    Follow heart healthy/low cholesterol diet  Avoid processed/fried foods/fast food  Exercise as tolerated up to 30 minutes 5 days a week  Take all medications as prescribed  Continue current dose of levothyroxine, last TFTs in normal range  No current GERD symptoms with use of Protonix every other day  She denies any anxiety at this time  Continue Tylenol as needed for arthritis of knees  We again discussed her insomnia symptoms.  She remains on Ambien 10 mg every night with Tylenol PM.  Patient states that she tried to decrease her Ambien several months ago but was unable to sleep.  No signs of abuse of Ambien and Sherman appropriate  She declines screening colonoscopy    Return to clinic in 6 months for Medicare wellness exam with full labs      Part of this note may be an electronic transcription/translation of spoken language to printed text using the Dragon Dictation System.

## 2023-03-31 RX ORDER — MONTELUKAST SODIUM 10 MG/1
10 TABLET ORAL EVERY EVENING
Qty: 90 TABLET | Refills: 1 | Status: SHIPPED | OUTPATIENT
Start: 2023-03-31

## 2023-05-31 ENCOUNTER — TELEPHONE (OUTPATIENT)
Dept: INTERNAL MEDICINE | Facility: CLINIC | Age: 73
End: 2023-05-31

## 2023-05-31 DIAGNOSIS — E07.9 DISORDER OF THYROID: ICD-10-CM

## 2023-05-31 DIAGNOSIS — E07.9 THYROID DISORDER: ICD-10-CM

## 2023-05-31 RX ORDER — LEVOTHYROXINE SODIUM 0.1 MG/1
100 TABLET ORAL DAILY
Qty: 90 TABLET | Refills: 1 | Status: SHIPPED | OUTPATIENT
Start: 2023-05-31

## 2023-05-31 NOTE — TELEPHONE ENCOUNTER
Rx Refill Note  Requested Prescriptions     Pending Prescriptions Disp Refills   • levothyroxine (SYNTHROID, LEVOTHROID) 100 MCG tablet 90 tablet 1     Sig: Take 1 tablet by mouth Daily. As directed      Last office visit with prescribing clinician: 2/6/2023   Last telemedicine visit with prescribing clinician: Visit date not found   Next office visit with prescribing clinician: Visit date not found                         Would you like a call back once the refill request has been completed: [] Yes [] No    If the office needs to give you a call back, can they leave a voicemail: [] Yes [] No    Susanna Westfall LPN  05/31/23, 09:25 EDT

## 2023-05-31 NOTE — TELEPHONE ENCOUNTER
Caller: Dang Butler    Relationship: Self    Best call back number: [phone]    Requested Prescriptions:   Requested Prescriptions     Pending Prescriptions Disp Refills   • levothyroxine (SYNTHROID, LEVOTHROID) 100 MCG tablet 90 tablet 1     Sig: Take 1 tablet by mouth Daily. As directed        Pharmacy where request should be sent: [id number] - [place], [place] - [address] - [phone]  - [phone] FX     Last office visit with prescribing clinician: 2/6/2023   Last telemedicine visit with prescribing clinician: Visit date not found   Next office visit with prescribing clinician: Visit date not found     Additional details provided by patient: PATIENT ONLY HAS 2-3 DAYS REMAINING    Does the patient have less than a 3 day supply:  [x] Yes  [] No    Would you like a call back once the refill request has been completed: [] Yes [] No    If the office needs to give you a call back, can they leave a voicemail: [] Yes [] No    Yumi Francois Rep   05/31/23 08:17 EDT

## 2023-08-08 ENCOUNTER — OFFICE VISIT (OUTPATIENT)
Dept: INTERNAL MEDICINE | Facility: CLINIC | Age: 73
End: 2023-08-08
Payer: MEDICARE

## 2023-08-08 VITALS
HEART RATE: 73 BPM | TEMPERATURE: 97.1 F | WEIGHT: 149.2 LBS | BODY MASS INDEX: 27.46 KG/M2 | HEIGHT: 62 IN | SYSTOLIC BLOOD PRESSURE: 110 MMHG | OXYGEN SATURATION: 100 % | DIASTOLIC BLOOD PRESSURE: 64 MMHG

## 2023-08-08 DIAGNOSIS — J30.1 SEASONAL ALLERGIC RHINITIS DUE TO POLLEN: ICD-10-CM

## 2023-08-08 DIAGNOSIS — E78.2 MIXED HYPERLIPIDEMIA: ICD-10-CM

## 2023-08-08 DIAGNOSIS — K21.9 GASTROESOPHAGEAL REFLUX DISEASE WITHOUT ESOPHAGITIS: Primary | ICD-10-CM

## 2023-08-08 DIAGNOSIS — E07.9 DISORDER OF THYROID: ICD-10-CM

## 2023-08-08 DIAGNOSIS — E07.9 THYROID DISORDER: ICD-10-CM

## 2023-08-08 DIAGNOSIS — K90.0 TRANSIENT GLUTEN SENSITIVITY: ICD-10-CM

## 2023-08-08 PROCEDURE — 1160F RVW MEDS BY RX/DR IN RCRD: CPT | Performed by: STUDENT IN AN ORGANIZED HEALTH CARE EDUCATION/TRAINING PROGRAM

## 2023-08-08 PROCEDURE — 1159F MED LIST DOCD IN RCRD: CPT | Performed by: STUDENT IN AN ORGANIZED HEALTH CARE EDUCATION/TRAINING PROGRAM

## 2023-08-08 PROCEDURE — 99214 OFFICE O/P EST MOD 30 MIN: CPT | Performed by: STUDENT IN AN ORGANIZED HEALTH CARE EDUCATION/TRAINING PROGRAM

## 2023-08-08 RX ORDER — MONTELUKAST SODIUM 10 MG/1
10 TABLET ORAL EVERY EVENING
Qty: 90 TABLET | Refills: 1 | Status: SHIPPED | OUTPATIENT
Start: 2023-08-08

## 2023-08-08 RX ORDER — PANTOPRAZOLE SODIUM 40 MG/1
40 TABLET, DELAYED RELEASE ORAL DAILY
Qty: 90 TABLET | Refills: 3 | Status: SHIPPED | OUTPATIENT
Start: 2023-08-08

## 2023-08-08 RX ORDER — LIFITEGRAST 50 MG/ML
1 SOLUTION/ DROPS OPHTHALMIC 2 TIMES DAILY
COMMUNITY

## 2023-08-08 RX ORDER — LEVOTHYROXINE SODIUM 0.1 MG/1
100 TABLET ORAL DAILY
Qty: 90 TABLET | Refills: 1 | Status: SHIPPED | OUTPATIENT
Start: 2023-08-08

## 2023-08-08 NOTE — PROGRESS NOTES
Subjective   Dang Butler is a 72 y.o. female.     History of Present Illness  Patient presents for follow-up on chronic medical conditions  GERD-patient taking Protonix and not having any heartburn symptoms.  Well-controlled  Hypothyroidism-patient on levothyroxine 100 mcg.  Denies any trouble swallowing, hair loss, fatigue.  Taking her medication first thing in the morning with a glass of water.  Rhinitis-patient on Singulair and doing well.  Denies any complaints of allergy-like symptoms.  Patient does have gluten sensitivity.  She is concerned about celiac disease and wondering if we could have that tested without doing a biopsy.    The following portions of the patient's history were reviewed and updated as appropriate: allergies, current medications, past family history, past medical history, past social history, past surgical history, and problem list.    Review of Systems   All other systems reviewed and are negative.    Objective   Physical Exam  Vitals and nursing note reviewed.   Constitutional:       Appearance: Normal appearance.   HENT:      Head: Normocephalic and atraumatic.      Right Ear: External ear normal.      Left Ear: External ear normal.      Nose: Nose normal.      Mouth/Throat:      Mouth: Mucous membranes are moist.      Pharynx: Oropharynx is clear. No oropharyngeal exudate or posterior oropharyngeal erythema.   Eyes:      Extraocular Movements: Extraocular movements intact.      Conjunctiva/sclera: Conjunctivae normal.      Pupils: Pupils are equal, round, and reactive to light.   Cardiovascular:      Rate and Rhythm: Normal rate and regular rhythm.      Pulses: Normal pulses.      Heart sounds: Normal heart sounds.   Pulmonary:      Effort: Pulmonary effort is normal.      Breath sounds: Normal breath sounds.   Abdominal:      General: Abdomen is flat. Bowel sounds are normal.      Palpations: Abdomen is soft.   Musculoskeletal:         General: Normal range of motion.      Cervical  back: Normal range of motion.   Skin:     General: Skin is warm.      Capillary Refill: Capillary refill takes less than 2 seconds.   Neurological:      General: No focal deficit present.      Mental Status: She is alert and oriented to person, place, and time. Mental status is at baseline.   Psychiatric:         Mood and Affect: Mood normal.         Behavior: Behavior normal.         Thought Content: Thought content normal.         Judgment: Judgment normal.       Assessment & Plan   Diagnoses and all orders for this visit:    1. Gastroesophageal reflux disease without esophagitis (Primary)  -     pantoprazole (PROTONIX) 40 MG EC tablet; Take 1 tablet by mouth Daily.  Dispense: 90 tablet; Refill: 3    2. Disorder of thyroid  -     levothyroxine (SYNTHROID, LEVOTHROID) 100 MCG tablet; Take 1 tablet by mouth Daily. As directed  Dispense: 90 tablet; Refill: 1  -     CBC & Differential  -     Comprehensive Metabolic Panel  -     TSH  -     T3, Free  -     T4, Free    3. Thyroid disorder  -     levothyroxine (SYNTHROID, LEVOTHROID) 100 MCG tablet; Take 1 tablet by mouth Daily. As directed  Dispense: 90 tablet; Refill: 1  -     CBC & Differential  -     Comprehensive Metabolic Panel  -     TSH  -     T3, Free  -     T4, Free    4. Seasonal allergic rhinitis due to pollen  -     montelukast (SINGULAIR) 10 MG tablet; Take 1 tablet by mouth Every Evening.  Dispense: 90 tablet; Refill: 1    5. Mixed hyperlipidemia  -     Lipid Panel    6. Transient gluten sensitivity  -     Tissue Transglutaminase, IgA; Future       Checking for celiac disease  Checking lipid panel, has been controlled with diet  Thyroid levels  Continue current medication regimen  Heart healthy diet  Exercise 30 minutes a day 5 days a week as tolerated

## 2023-08-19 LAB
ALBUMIN SERPL-MCNC: 4.3 G/DL (ref 3.5–5.2)
ALBUMIN/GLOB SERPL: 1.9 G/DL
ALP SERPL-CCNC: 72 U/L (ref 39–117)
ALT SERPL-CCNC: 26 U/L (ref 1–33)
AST SERPL-CCNC: 23 U/L (ref 1–32)
BASOPHILS # BLD AUTO: 0.05 10*3/MM3 (ref 0–0.2)
BASOPHILS NFR BLD AUTO: 1 % (ref 0–1.5)
BILIRUB SERPL-MCNC: 0.4 MG/DL (ref 0–1.2)
BUN SERPL-MCNC: 17 MG/DL (ref 8–23)
BUN/CREAT SERPL: 16.8 (ref 7–25)
CALCIUM SERPL-MCNC: 9.8 MG/DL (ref 8.6–10.5)
CHLORIDE SERPL-SCNC: 102 MMOL/L (ref 98–107)
CHOLEST SERPL-MCNC: 214 MG/DL (ref 0–200)
CO2 SERPL-SCNC: 22.9 MMOL/L (ref 22–29)
CREAT SERPL-MCNC: 1.01 MG/DL (ref 0.57–1)
EGFRCR SERPLBLD CKD-EPI 2021: 59.3 ML/MIN/1.73
EOSINOPHIL # BLD AUTO: 0.23 10*3/MM3 (ref 0–0.4)
EOSINOPHIL NFR BLD AUTO: 4.4 % (ref 0.3–6.2)
ERYTHROCYTE [DISTWIDTH] IN BLOOD BY AUTOMATED COUNT: 12.7 % (ref 12.3–15.4)
GLOBULIN SER CALC-MCNC: 2.3 GM/DL
GLUCOSE SERPL-MCNC: 89 MG/DL (ref 65–99)
HCT VFR BLD AUTO: 45.7 % (ref 34–46.6)
HDLC SERPL-MCNC: 70 MG/DL (ref 40–60)
HGB BLD-MCNC: 15.3 G/DL (ref 12–15.9)
IMM GRANULOCYTES # BLD AUTO: 0.02 10*3/MM3 (ref 0–0.05)
IMM GRANULOCYTES NFR BLD AUTO: 0.4 % (ref 0–0.5)
LDLC SERPL CALC-MCNC: 132 MG/DL (ref 0–100)
LYMPHOCYTES # BLD AUTO: 1.51 10*3/MM3 (ref 0.7–3.1)
LYMPHOCYTES NFR BLD AUTO: 28.9 % (ref 19.6–45.3)
MCH RBC QN AUTO: 30.7 PG (ref 26.6–33)
MCHC RBC AUTO-ENTMCNC: 33.5 G/DL (ref 31.5–35.7)
MCV RBC AUTO: 91.8 FL (ref 79–97)
MONOCYTES # BLD AUTO: 0.58 10*3/MM3 (ref 0.1–0.9)
MONOCYTES NFR BLD AUTO: 11.1 % (ref 5–12)
NEUTROPHILS # BLD AUTO: 2.83 10*3/MM3 (ref 1.7–7)
NEUTROPHILS NFR BLD AUTO: 54.2 % (ref 42.7–76)
NRBC BLD AUTO-RTO: 0 /100 WBC (ref 0–0.2)
PLATELET # BLD AUTO: 168 10*3/MM3 (ref 140–450)
POTASSIUM SERPL-SCNC: 4.8 MMOL/L (ref 3.5–5.2)
PROT SERPL-MCNC: 6.6 G/DL (ref 6–8.5)
RBC # BLD AUTO: 4.98 10*6/MM3 (ref 3.77–5.28)
SODIUM SERPL-SCNC: 137 MMOL/L (ref 136–145)
T3FREE SERPL-MCNC: 2.1 PG/ML (ref 2–4.4)
T4 FREE SERPL-MCNC: 1.82 NG/DL (ref 0.93–1.7)
TRIGL SERPL-MCNC: 70 MG/DL (ref 0–150)
TSH SERPL DL<=0.005 MIU/L-ACNC: 1.9 UIU/ML (ref 0.27–4.2)
TTG IGA SER-ACNC: <2 U/ML (ref 0–3)
VLDLC SERPL CALC-MCNC: 12 MG/DL (ref 5–40)
WBC # BLD AUTO: 5.22 10*3/MM3 (ref 3.4–10.8)

## 2023-11-22 DIAGNOSIS — J30.1 SEASONAL ALLERGIC RHINITIS DUE TO POLLEN: ICD-10-CM

## 2023-11-22 RX ORDER — MONTELUKAST SODIUM 10 MG/1
10 TABLET ORAL EVERY EVENING
Qty: 90 TABLET | Refills: 2 | Status: SHIPPED | OUTPATIENT
Start: 2023-11-22

## 2023-11-22 NOTE — TELEPHONE ENCOUNTER
Caller: Dang Butler    Relationship: Self    Best call back number: [phone]    Requested Prescriptions:   Requested Prescriptions     Pending Prescriptions Disp Refills    montelukast (SINGULAIR) 10 MG tablet 90 tablet 1     Sig: Take 1 tablet by mouth Every Evening.        Pharmacy where request should be sent: [facility] #[id number] - [place], [place] - [address] - [phone]  - [phone] FX     Last office visit with prescribing clinician: 8/8/2023   Last telemedicine visit with prescribing clinician: Visit date not found   Next office visit with prescribing clinician: 8/9/2024     Additional details provided by patient: WILL BE OUT OVER HOLIDAY WEEKEND     Does the patient have less than a 3 day supply:  [x] Yes  [] No    Would you like a call back once the refill request has been completed: [x] Yes [] No    If the office needs to give you a call back, can they leave a voicemail: [x] Yes [] No    Yumi Banda Rep   11/22/23 09:32 EST

## 2023-11-22 NOTE — TELEPHONE ENCOUNTER
Rx Refill Note  Requested Prescriptions     Pending Prescriptions Disp Refills    montelukast (SINGULAIR) 10 MG tablet 90 tablet 1     Sig: Take 1 tablet by mouth Every Evening.      Last office visit with prescribing clinician: 8/8/2023   Last telemedicine visit with prescribing clinician: Visit date not found   Next office visit with prescribing clinician: 8/9/2024     Marlo Stiles MA  11/22/23, 09:51 EST

## 2024-02-01 ENCOUNTER — TELEPHONE (OUTPATIENT)
Dept: INTERNAL MEDICINE | Facility: CLINIC | Age: 74
End: 2024-02-01
Payer: MEDICARE

## 2024-07-24 ENCOUNTER — TRANSCRIBE ORDERS (OUTPATIENT)
Dept: ADMINISTRATIVE | Facility: HOSPITAL | Age: 74
End: 2024-07-24
Payer: MEDICARE

## 2024-07-24 DIAGNOSIS — Z78.0 POSTMENOPAUSAL: Primary | ICD-10-CM

## 2025-03-20 ENCOUNTER — TELEPHONE (OUTPATIENT)
Dept: INTERNAL MEDICINE | Facility: CLINIC | Age: 75
End: 2025-03-20
Payer: MEDICARE

## 2025-03-20 NOTE — TELEPHONE ENCOUNTER
In telephone encounter from Feb of 2024, provider agreed to allow patient to transfer. She was supposed to call back to decide to who.

## 2025-03-20 NOTE — TELEPHONE ENCOUNTER
Caller: Dang Butler    Relationship: Self    Best call back number: [phone]     Who is your current provider: DR. MATHIS     Is your current provider offboarding? NO     Who would you like your new provider to be: CAROL SNIDER     What are your reasons for transferring care: JUST WANTS TO SEE HER AS PCP DUE TO HER DAUGHTER GOING TO HER TOO.   DAUGHTERS NAME : SAM SIFUENTESLOCK

## 2025-03-21 DIAGNOSIS — J30.1 SEASONAL ALLERGIC RHINITIS DUE TO POLLEN: ICD-10-CM

## 2025-03-21 DIAGNOSIS — E07.9 THYROID DISORDER: ICD-10-CM

## 2025-03-21 DIAGNOSIS — E07.9 DISORDER OF THYROID: ICD-10-CM

## 2025-03-21 RX ORDER — LEVOTHYROXINE SODIUM 100 UG/1
100 TABLET ORAL DAILY
Qty: 90 TABLET | Refills: 1 | Status: SHIPPED | OUTPATIENT
Start: 2025-03-21

## 2025-03-21 RX ORDER — MONTELUKAST SODIUM 10 MG/1
10 TABLET ORAL EVERY EVENING
Qty: 90 TABLET | Refills: 2 | Status: SHIPPED | OUTPATIENT
Start: 2025-03-21

## 2025-03-21 NOTE — TELEPHONE ENCOUNTER
Caller: Dang Butler    Relationship: Self    Best call back number: [phone]     Requested Prescriptions:   Requested Prescriptions     Pending Prescriptions Disp Refills    levothyroxine (SYNTHROID, LEVOTHROID) 100 MCG tablet 90 tablet 1     Sig: Take 1 tablet by mouth Daily. As directed    montelukast (SINGULAIR) 10 MG tablet 90 tablet 2     Sig: Take 1 tablet by mouth Every Evening.        Pharmacy where request should be sent: [facility] - [place], [place] - [address] - [phone] [facility] [phone] FX     Last office visit with prescribing clinician: 8/8/2023   Last telemedicine visit with prescribing clinician: Visit date not found   Next office visit with prescribing clinician: Visit date not found     Additional details provided by patient: PATIENT HAS ABOUT A WEEK LEFT, BUT WILL BE OUT BEFORE HER NEXT APPOINTMENT. SHE IS TRANSFERRING CARE TO [facility]. HER APPOINTMENT IS SCHEDULED FOR 04/04/25. COULD SHE AT LEAST GET ENOUGH UNTIL THAT APPOINTMENT?    Does the patient have less than a 3 day supply:  [] Yes  [x] No    Would you like a call back once the refill request has been completed: [] Yes [x] No    If the office needs to give you a call back, can they leave a voicemail: [] Yes [x] No    Yumi Jimenez Rep   03/21/25 09:15 EDT

## 2025-03-21 NOTE — TELEPHONE ENCOUNTER
Rx Refill Note  Last TSH 8/18/2023  Next OV 4/4/2025 Shavon  Requested Prescriptions     Pending Prescriptions Disp Refills    levothyroxine (SYNTHROID, LEVOTHROID) 100 MCG tablet 90 tablet 1     Sig: Take 1 tablet by mouth Daily. As directed     Signed Prescriptions Disp Refills    montelukast (SINGULAIR) 10 MG tablet 90 tablet 2     Sig: Take 1 tablet by mouth Every Evening.     Authorizing Provider: COREY MATHIS     Ordering User: MONICA GAN      Last office visit with prescribing clinician: 8/8/2023   Last telemedicine visit with prescribing clinician: Visit date not found   Next office visit with prescribing clinician: Visit date not found                         Would you like a call back once the refill request has been completed: [] Yes [] No    If the office needs to give you a call back, can they leave a voicemail: [] Yes [] No    Monica Gan CMA  03/21/25, 09:26 EDT

## 2025-04-04 ENCOUNTER — OFFICE VISIT (OUTPATIENT)
Dept: INTERNAL MEDICINE | Facility: CLINIC | Age: 75
End: 2025-04-04
Payer: MEDICARE

## 2025-04-04 VITALS
TEMPERATURE: 98.1 F | DIASTOLIC BLOOD PRESSURE: 67 MMHG | WEIGHT: 151 LBS | HEART RATE: 75 BPM | BODY MASS INDEX: 27.79 KG/M2 | RESPIRATION RATE: 18 BRPM | SYSTOLIC BLOOD PRESSURE: 123 MMHG | HEIGHT: 62 IN | OXYGEN SATURATION: 99 %

## 2025-04-04 DIAGNOSIS — F51.01 PRIMARY INSOMNIA: ICD-10-CM

## 2025-04-04 DIAGNOSIS — G89.29 CHRONIC PAIN OF RIGHT KNEE: ICD-10-CM

## 2025-04-04 DIAGNOSIS — Z76.89 ENCOUNTER TO ESTABLISH CARE: Primary | ICD-10-CM

## 2025-04-04 DIAGNOSIS — M25.561 CHRONIC PAIN OF RIGHT KNEE: ICD-10-CM

## 2025-04-04 DIAGNOSIS — M79.641 PAIN IN RIGHT HAND: ICD-10-CM

## 2025-04-04 DIAGNOSIS — E78.2 MIXED HYPERLIPIDEMIA: ICD-10-CM

## 2025-04-04 DIAGNOSIS — K21.9 GASTROESOPHAGEAL REFLUX DISEASE WITHOUT ESOPHAGITIS: ICD-10-CM

## 2025-04-04 DIAGNOSIS — G25.81 RESTLESS LEG: ICD-10-CM

## 2025-04-04 DIAGNOSIS — E66.3 OVERWEIGHT (BMI 25.0-29.9): ICD-10-CM

## 2025-04-04 DIAGNOSIS — M15.0 PRIMARY OSTEOARTHRITIS INVOLVING MULTIPLE JOINTS: ICD-10-CM

## 2025-04-04 DIAGNOSIS — E53.8 VITAMIN B 12 DEFICIENCY: ICD-10-CM

## 2025-04-04 PROBLEM — M54.2 NECK PAIN: Status: ACTIVE | Noted: 2024-03-19

## 2025-04-04 PROBLEM — Z00.00 ENCOUNTER FOR MEDICARE ANNUAL WELLNESS EXAM: Status: RESOLVED | Noted: 2019-04-25 | Resolved: 2025-04-04

## 2025-04-04 PROBLEM — Z12.11 SCREENING FOR COLON CANCER: Status: RESOLVED | Noted: 2020-06-23 | Resolved: 2025-04-04

## 2025-04-04 PROBLEM — M25.562 ARTHRALGIA OF BOTH KNEES: Status: ACTIVE | Noted: 2017-08-23

## 2025-04-04 LAB
ALBUMIN SERPL-MCNC: 4 G/DL (ref 3.5–5.2)
ALBUMIN/GLOB SERPL: 1.3 G/DL
ALP SERPL-CCNC: 93 U/L (ref 39–117)
ALT SERPL-CCNC: 13 U/L (ref 1–33)
AST SERPL-CCNC: 20 U/L (ref 1–32)
BILIRUB SERPL-MCNC: 0.4 MG/DL (ref 0–1.2)
BUN SERPL-MCNC: 13 MG/DL (ref 8–23)
BUN/CREAT SERPL: 17.1 (ref 7–25)
CALCIUM SERPL-MCNC: 9.4 MG/DL (ref 8.6–10.5)
CHLORIDE SERPL-SCNC: 102 MMOL/L (ref 98–107)
CHOLEST SERPL-MCNC: 221 MG/DL (ref 0–200)
CO2 SERPL-SCNC: 25.7 MMOL/L (ref 22–29)
CREAT SERPL-MCNC: 0.76 MG/DL (ref 0.57–1)
EGFRCR SERPLBLD CKD-EPI 2021: 82.3 ML/MIN/1.73
ERYTHROCYTE [DISTWIDTH] IN BLOOD BY AUTOMATED COUNT: 12.3 % (ref 12.3–15.4)
FERRITIN SERPL-MCNC: 157 NG/ML (ref 13–150)
FOLATE SERPL-MCNC: 7.3 NG/ML (ref 4.78–24.2)
GLOBULIN SER CALC-MCNC: 3 GM/DL
GLUCOSE SERPL-MCNC: 93 MG/DL (ref 65–99)
HCT VFR BLD AUTO: 46.1 % (ref 34–46.6)
HDLC SERPL-MCNC: 56 MG/DL (ref 40–60)
HGB BLD-MCNC: 15.3 G/DL (ref 12–15.9)
IRON SATN MFR SERPL: 30 % (ref 20–50)
IRON SERPL-MCNC: 108 MCG/DL (ref 37–145)
LDLC SERPL CALC-MCNC: 144 MG/DL (ref 0–100)
MCH RBC QN AUTO: 30.4 PG (ref 26.6–33)
MCHC RBC AUTO-ENTMCNC: 33.2 G/DL (ref 31.5–35.7)
MCV RBC AUTO: 91.7 FL (ref 79–97)
PLATELET # BLD AUTO: 194 10*3/MM3 (ref 140–450)
POTASSIUM SERPL-SCNC: 4.6 MMOL/L (ref 3.5–5.2)
PROT SERPL-MCNC: 7 G/DL (ref 6–8.5)
RBC # BLD AUTO: 5.03 10*6/MM3 (ref 3.77–5.28)
SODIUM SERPL-SCNC: 137 MMOL/L (ref 136–145)
TIBC SERPL-MCNC: 358 MCG/DL
TRIGL SERPL-MCNC: 117 MG/DL (ref 0–150)
TSH SERPL DL<=0.005 MIU/L-ACNC: 3.68 UIU/ML (ref 0.27–4.2)
UIBC SERPL-MCNC: 250 MCG/DL (ref 112–346)
VIT B12 SERPL-MCNC: 858 PG/ML (ref 211–946)
VLDLC SERPL CALC-MCNC: 21 MG/DL (ref 5–40)
WBC # BLD AUTO: 6.83 10*3/MM3 (ref 3.4–10.8)

## 2025-04-04 RX ORDER — VONOPRAZAN FUMARATE 26.72 MG/1
20 TABLET ORAL DAILY PRN
Qty: 15 TABLET | Refills: 0 | COMMUNITY
Start: 2025-04-04

## 2025-04-04 RX ORDER — NYSTATIN 100000 U/G
1 CREAM TOPICAL 2 TIMES DAILY
COMMUNITY

## 2025-04-04 RX ORDER — MIRABEGRON 50 MG/1
1 TABLET, FILM COATED, EXTENDED RELEASE ORAL DAILY
COMMUNITY
Start: 2025-01-27

## 2025-04-04 RX ORDER — VONOPRAZAN FUMARATE 26.72 MG/1
20 TABLET ORAL DAILY
COMMUNITY
Start: 2024-08-06 | End: 2025-08-06

## 2025-04-04 RX ORDER — ESTRADIOL 0.1 MG/D
1 FILM, EXTENDED RELEASE TRANSDERMAL 2 TIMES WEEKLY
COMMUNITY
Start: 2025-02-26

## 2025-04-04 RX ORDER — LANOLIN ALCOHOL/MO/W.PET/CERES
1000 CREAM (GRAM) TOPICAL DAILY
COMMUNITY

## 2025-04-04 RX ORDER — POLYETHYLENE GLYCOL 3350 17 G/17G
17 POWDER, FOR SOLUTION ORAL DAILY
COMMUNITY

## 2025-04-04 NOTE — PROGRESS NOTES
"  Office Visit      Patient Name: Dang Butler  : 1950   MRN: [id number]   Care Team: Patient Care Team:  Lori Sands APRN as PCP - General (Nurse Practitioner)    Chief Complaint  Establish Care (Previous PCP (Cassidy Potts) notes in chart/)    Subjective     Subjective      Dang Butler presents to [facility] to establish care with me.   Previous patient of Dr. Potts.   She suffers from insomnia, HLD, GERD, osteoarthritis, hypothyroidism.   She is followed by GYN, orthopedic, and Gastroenterology.   Complaining today of what feels like restless leg syndrome. Doesn't happen every night but usually 2-4 nights per week. Starts with an ache of the plantar aspect of the foot then feels like she has to get up and walk around. No recent labs. No daytime symptoms.   There is no swelling, erythema, or warmth to the feet. She does take a daily magnesium. She has not tried anything for the symptoms.     Arthritis- has been having more finger, wrist, and shoulder pain. Effecting her bilaterally but right is worse than the left. No significant swelling, erythema or warmth. She has tried arthritis tylenol and ibuprofen with some relief, she has not taken consistently.     GYN manages her ambien which she has been on for many years for insomnia and helps. They also manage her estradiol and mybetriq.     She has been seeing GI for GERD and is on voquezna, currently she takes as needed and controls her symptoms . She is hoping to get a refill on medication today.     Objective     Objective   Vital Signs:   /67   Pulse 75   Temp 98.1 °F (36.7 °C) (Infrared)   Resp 18   Ht 157.5 cm (62.01\")   Wt 68.5 kg (151 lb)   SpO2 99%   BMI 27.61 kg/m²     Physical Exam  Vitals and nursing note reviewed.   Constitutional:       General: She is not in acute distress.     Appearance: Normal appearance. She is not toxic-appearing.   Eyes:      Pupils: Pupils are equal, round, and " reactive to light.   Neck:      Vascular: No carotid bruit.   Cardiovascular:      Rate and Rhythm: Normal rate and regular rhythm.      Heart sounds: Normal heart sounds. No murmur heard.  Pulmonary:      Effort: Pulmonary effort is normal. No respiratory distress.      Breath sounds: Normal breath sounds. No wheezing.   Abdominal:      General: Bowel sounds are normal. There is no distension.      Palpations: Abdomen is soft.      Tenderness: There is no abdominal tenderness.   Musculoskeletal:         General: Normal range of motion.      Cervical back: Neck supple. No tenderness.   Skin:     General: Skin is warm and dry.      Findings: No rash.   Neurological:      General: No focal deficit present.      Mental Status: She is alert.   Psychiatric:         Mood and Affect: Mood normal.         Behavior: Behavior normal.        Assessment / Plan      Assessment & Plan   Problem List Items Addressed This Visit       Gastroesophageal reflux disease without esophagitis    Relevant Medications    Vonoprazan Fumarate (Voquezna) 20 MG tablet    Other Relevant Orders    CBC No Differential    Comprehensive metabolic panel    Lipid panel    Vitamin B12    Folate    Iron and TIBC    Ferritin    TSH Rfx On Abnormal To Free T4    Samples provided of voquezna. Recommend continuing dietary modifications and follow-up with GI as scheduled.     Hyperlipidemia    Relevant Orders    CBC No Differential    Comprehensive metabolic panel    Lipid panel    Vitamin B12    Folate    Iron and TIBC    Ferritin    TSH Rfx On Abnormal To Free T4    Update lipid panel fasting. Recommend Mediterranean diet.     Insomnia    Relevant Orders    CBC No Differential    Comprehensive metabolic panel    Lipid panel    Vitamin B12    Folate    Iron and TIBC    Ferritin    TSH Rfx On Abnormal To Free T4    Managed by GYN, keep scheduled follow-up and continue current medication regimen.     Vitamin B 12 deficiency    Relevant Orders    CBC No  Differential    Comprehensive metabolic panel    Lipid panel    Vitamin B12    Folate    Iron and TIBC    Ferritin    TSH Rfx On Abnormal To Free T4    Update labs, replace as clinically indicated.    Overweight (BMI 25.0-29.9)    Relevant Orders    CBC No Differential    Comprehensive metabolic panel    Lipid panel    Vitamin B12    Folate    Iron and TIBC    Ferritin    TSH Rfx On Abnormal To Free T4    Healthy diet and exercise recommendations provided.     Other Visit Diagnoses         Encounter to establish care    -  Primary    Relevant Orders    CBC No Differential    Comprehensive metabolic panel    Lipid panel    Vitamin B12    Folate    Iron and TIBC    Ferritin    TSH Rfx On Abnormal To Free T4      Restless leg        Relevant Orders    CBC No Differential    Comprehensive metabolic panel    Lipid panel    Vitamin B12    Folate    Iron and TIBC    Ferritin    TSH Rfx On Abnormal To Free T4    Update labs to rule out underlying causes, consider requip if normal. Recommend warm bath and stretching.       Primary osteoarthritis involving multiple joints        Relevant Orders    CBC No Differential    Comprehensive metabolic panel    Lipid panel    Vitamin B12    Folate    Iron and TIBC    Ferritin    TSH Rfx On Abnormal To Free T4    Symptoms consistent with the above, recommend arthritis tylenol up to twice daily, diclofenac gel PRN, stretching, and weight bearing exercise.       Pain in right hand        Relevant Orders    Iron and TIBC    See above.      Chronic pain of right knee        Relevant Orders    Ferritin    See above. Follow-up with orthopedics PRN.             BMI is >= 25 and <30. (Overweight) The following options were offered after discussion;: exercise counseling/recommendations, nutrition counseling/recommendations, and information on healthy weight added to patient's after visit summary       Follow Up   Return for Medicare Wellness.  Patient was given instructions and counseling  regarding her condition or for health maintenance advice. Please see specific information pulled into the AVS if appropriate.     HARRY Cee  [facility]

## 2025-04-08 RX ORDER — ROPINIROLE 0.25 MG/1
0.25 TABLET, FILM COATED ORAL NIGHTLY
Qty: 30 TABLET | Refills: 2 | Status: SHIPPED | OUTPATIENT
Start: 2025-04-08

## 2025-06-12 ENCOUNTER — TELEPHONE (OUTPATIENT)
Dept: INTERNAL MEDICINE | Facility: CLINIC | Age: 75
End: 2025-06-12

## 2025-06-12 DIAGNOSIS — E07.9 DISORDER OF THYROID: ICD-10-CM

## 2025-06-12 DIAGNOSIS — E07.9 THYROID DISORDER: ICD-10-CM

## 2025-06-12 RX ORDER — LEVOTHYROXINE SODIUM 100 UG/1
100 TABLET ORAL DAILY
Qty: 90 TABLET | Refills: 1 | Status: SHIPPED | OUTPATIENT
Start: 2025-06-12

## 2025-07-09 ENCOUNTER — OFFICE VISIT (OUTPATIENT)
Dept: INTERNAL MEDICINE | Facility: CLINIC | Age: 75
End: 2025-07-09
Payer: MEDICARE

## 2025-07-09 VITALS
RESPIRATION RATE: 20 BRPM | HEIGHT: 62 IN | BODY MASS INDEX: 27.79 KG/M2 | DIASTOLIC BLOOD PRESSURE: 67 MMHG | OXYGEN SATURATION: 98 % | SYSTOLIC BLOOD PRESSURE: 122 MMHG | TEMPERATURE: 97.3 F | HEART RATE: 66 BPM | WEIGHT: 151 LBS

## 2025-07-09 DIAGNOSIS — G56.91 ENTRAPMENT NEUROPATHY OF PERIPHERAL NERVE OF RIGHT HAND: ICD-10-CM

## 2025-07-09 DIAGNOSIS — G25.81 RESTLESS LEG: ICD-10-CM

## 2025-07-09 DIAGNOSIS — E66.3 OVERWEIGHT (BMI 25.0-29.9): ICD-10-CM

## 2025-07-09 DIAGNOSIS — Z78.0 MENOPAUSE: ICD-10-CM

## 2025-07-09 DIAGNOSIS — K21.9 GASTROESOPHAGEAL REFLUX DISEASE WITHOUT ESOPHAGITIS: ICD-10-CM

## 2025-07-09 DIAGNOSIS — E03.9 ACQUIRED HYPOTHYROIDISM: ICD-10-CM

## 2025-07-09 DIAGNOSIS — M15.9 GENERALIZED OSTEOARTHRITIS: ICD-10-CM

## 2025-07-09 DIAGNOSIS — E78.2 MIXED HYPERLIPIDEMIA: ICD-10-CM

## 2025-07-09 DIAGNOSIS — Z51.81 ENCOUNTER FOR THERAPEUTIC DRUG LEVEL MONITORING: ICD-10-CM

## 2025-07-09 DIAGNOSIS — Z00.00 MEDICARE ANNUAL WELLNESS VISIT, SUBSEQUENT: Primary | ICD-10-CM

## 2025-07-09 RX ORDER — GABAPENTIN 300 MG/1
300 CAPSULE ORAL NIGHTLY
Qty: 30 CAPSULE | Refills: 2 | Status: SHIPPED | OUTPATIENT
Start: 2025-07-09

## 2025-07-09 NOTE — ASSESSMENT & PLAN NOTE
Patient's (Body mass index is 27.61 kg/m².) indicates that they are overweight with health conditions that include dyslipidemias . Weight is unchanged. BMI is above average; BMI management plan is completed. We discussed portion control, increasing exercise, and Information on healthy weight added to patient's after visit summary.

## 2025-07-09 NOTE — ASSESSMENT & PLAN NOTE
Avoid eating spicy foods, caffeinated and carbonated beverages, alcohol, and chocolate. Try not to eat or drink within 3 hours of going to bed at night. May elevate the head of the bed. Eat smaller portions. Adhere to medication regimen as prescribed. RTC if symptoms worsen despite interventions.

## 2025-07-09 NOTE — ASSESSMENT & PLAN NOTE
Encouraged to continue arthritis Tylenol, follow-up with orthopedics, stay active with walking.

## 2025-07-09 NOTE — PROGRESS NOTES
Subjective   The ABCs of the Annual Wellness Visit  Medicare Wellness Visit      Dang Butler is a 74 y.o. patient who presents for a Medicare Wellness Visit.    The following portions of the patient's history were reviewed and   updated as appropriate: allergies, current medications, past family history, past medical history, past social history, past surgical history, and problem list.    Compared to one year ago, the patient's physical   health is the same.  Compared to one year ago, the patient's mental   health is the same.    Recent Hospitalizations:  She was not admitted to the hospital during the last year.     Current Medical Providers:  Patient Care Team:  Lori Sands APRN as PCP - General (Family Medicine)    Outpatient Medications Prior to Visit   Medication Sig Dispense Refill    Apoaequorin (PREVAGEN PO) Take  by mouth.      desoximetasone (TOPICORT) 0.05 % cream Apply  topically to the appropriate area as directed Daily. 60 g 1    estradiol (VIVELLE-DOT) 0.1 MG/24HR patch Place 1 patch on the skin as directed by provider 2 (Two) Times a Week.      levothyroxine (SYNTHROID, LEVOTHROID) 100 MCG tablet Take 1 tablet by mouth Daily. As directed 90 tablet 1    Lifitegrast (Xiidra) 5 % ophthalmic solution Administer 1 drop to both eyes 2 (Two) Times a Day.      Magnesium 400 MG tablet Take 1 tablet by mouth Daily. 90 tablet 3    montelukast (SINGULAIR) 10 MG tablet Take 1 tablet by mouth Every Evening. 90 tablet 2    Myrbetriq 50 MG tablet sustained-release 24 hour 24 hr tablet Take 50 mg by mouth Daily.      nystatin (MYCOSTATIN) 738130 UNIT/GM cream Apply 1 Application topically to the appropriate area as directed 2 (Two) Times a Day.      polyethylene glycol (MIRALAX) 17 g packet Take 17 g by mouth Daily.      Scopolamine (Transderm-Scop, 1.5 MG,) 1 MG/3DAYS patch Place 1 patch on the skin as directed by provider Every 72 (Seventy-Two) Hours. 5 each 1    triamcinolone (KENALOG) 0.1 % cream Apply   "topically to the appropriate area as directed 2 (Two) Times a Day. 180 g 1    vitamin B-12 (CYANOCOBALAMIN) 1000 MCG tablet Take 1 tablet by mouth Daily.      VITAMIN D PO Take  by mouth.      Vonoprazan Fumarate (Voquezna) 20 MG tablet Take 1 tablet by mouth Daily.      zolpidem (AMBIEN) 10 MG tablet Take 1/2-1 tablet nightly as needed for sleep. 30 tablet 2    rOPINIRole (REQUIP) 0.25 MG tablet Take 1 tablet by mouth Every Night. Take 1 hour before bedtime. (Patient not taking: Reported on 7/9/2025) 30 tablet 2    Vonoprazan Fumarate (Voquezna) 20 MG tablet Take 1 tablet by mouth Daily As Needed (GERD). (Patient not taking: Reported on 7/9/2025) 15 tablet 0     No facility-administered medications prior to visit.     No opioid medication identified on active medication list. I have reviewed chart for other potential  high risk medication/s and harmful drug interactions in the elderly.      Aspirin is not on active medication list.  Aspirin use is not indicated based on review of current medical condition/s. Risk of harm outweighs potential benefits.  .    Patient Active Problem List   Diagnosis    Atopic rhinitis    Gastroesophageal reflux disease without esophagitis    Generalized osteoarthritis    Hyperlipidemia    Insomnia    Vitamin D deficiency    Menopausal and postmenopausal disorder    Atopic dermatitis    Thyroid disorder    Chronic fatigue    Vitamin B 12 deficiency    Rotator cuff injury, left, sequela    Anxiety    Overweight (BMI 25.0-29.9)    Screening for osteoporosis    Arthralgia of both knees    Neck pain     Advance Care Planning Advance Directive is not on file.  ACP discussion was held with the patient during this visit. Patient has an advance directive (not in EMR), copy requested.            Objective   Vitals:    07/09/25 1255   BP: 122/67   Pulse: 66   Resp: 20   Temp: 97.3 °F (36.3 °C)   SpO2: 98%   Weight: 68.5 kg (151 lb)   Height: 157.5 cm (62.01\")   PainSc: 0-No pain       Estimated " "body mass index is 27.61 kg/m² as calculated from the following:    Height as of this encounter: 157.5 cm (62.01\").    Weight as of this encounter: 68.5 kg (151 lb).                Does the patient have evidence of cognitive impairment? No                                                                                                Health  Risk Assessment    Smoking Status:  Social History     Tobacco Use   Smoking Status Never   Smokeless Tobacco Never     Alcohol Consumption:  Social History     Substance and Sexual Activity   Alcohol Use Yes    Comment: Socially       Fall Risk Screen  STEADI Fall Risk Assessment was completed, and patient is at LOW risk for falls.Assessment completed on:2025    Depression Screening   Little interest or pleasure in doing things? Not at all   Feeling down, depressed, or hopeless? Not at all   PHQ-2 Total Score 0      Health Habits and Functional and Cognitive Screenin/9/2025     1:02 PM   Functional & Cognitive Status   Do you have difficulty preparing food and eating? No   Do you have difficulty bathing yourself, getting dressed or grooming yourself? No   Do you have difficulty using the toilet? No   Do you have difficulty moving around from place to place? No   Do you have trouble with steps or getting out of a bed or a chair? No   Current Diet Well Balanced Diet   Dental Exam Up to date   Eye Exam Up to date   Exercise (times per week) 0 times per week   Current Exercises Include No Regular Exercise   Do you need help using the phone?  No   Are you deaf or do you have serious difficulty hearing?  No   Do you need help to go to places out of walking distance? No   Do you need help shopping? No   Do you need help preparing meals?  No   Do you need help with housework?  No   Do you need help with laundry? No   Do you need help taking your medications? No   Do you need help managing money? No   Do you ever drive or ride in a car without wearing a seat belt? No   Have " you felt unusual fatigue (could be tiredness), stress, anger or loneliness in the last month? No   Who do you live with? Alone   If you need help, do you have trouble finding someone available to you? No   Have you been bothered in the last four weeks by sexual problems? No   Do you have difficulty concentrating, remembering or making decisions? No           Age-appropriate Screening Schedule:  Refer to the list below for future screening recommendations based on patient's age, sex and/or medical conditions. Orders for these recommended tests are listed in the plan section. The patient has been provided with a written plan.    Health Maintenance List  Health Maintenance   Topic Date Due    DXA SCAN  08/12/2024    COVID-19 Vaccine (8 - 2024-25 season) 04/04/2026 (Originally 4/3/2025)    INFLUENZA VACCINE  10/01/2025    LIPID PANEL  04/04/2026    ANNUAL WELLNESS VISIT  07/09/2026    MAMMOGRAM  04/02/2027    HEPATITIS C SCREENING  Completed    Pneumococcal Vaccine 50+  Completed    ZOSTER VACCINE  Completed    TDAP/TD VACCINES  Discontinued    COLORECTAL CANCER SCREENING  Discontinued                                                                                                                                                CMS Preventative Services Quick Reference  Risk Factors Identified During Encounter  Chronic Pain: Natural history and expected course discussed. Questions answered.  OTC analgesics as needed. Proper dosing schedule discussed.   Follow-up with orthopedics  Dental Screening Recommended  Vision Screening Recommended    The above risks/problems have been discussed with the patient.  Pertinent information has been shared with the patient in the After Visit Summary.  An After Visit Summary and PPPS were made available to the patient.    Follow Up:   Next Medicare Wellness visit to be scheduled in 1 year.         Additional E&M Note during same encounter follows:  Patient has additional, significant, and  "separately identifiable condition(s)/problem(s) that require work above and beyond the Medicare Wellness Visit     Chief Complaint  Medicare Wellness-subsequent    Subjective   HPI   Dang is also being seen today for additional medical problem/s.  Here today for Medicare wellness visit with complaints of numbness and pain in the right fingertips.  She sees orthopedics, has been diagnosed with carpal tunnel and received injection about 1 month ago.  Despite injection she continues to have a bothersome shooting pain in the thumb and pointer finger.  It is not constant problem.  It is not relieved by ibuprofen or Tylenol.  She uses night bracing still gets no relief from the symptoms.  She suffers from restless leg syndrome, Requip was called in last time but she was unaware of prescription.  Has less leg symptoms are intermittent, have been somewhat better since her last visit.  Her labs were normal.  She was taking gabapentin for knee pain, she was hesitant to try it for the symptoms because she is no longer prescribe the medication.  Historically she tolerated 600 mg daily.  GERD-she is tolerating liquids in the and taking as prescribed.  No epigastric pain, chest pain, difficulty swallowing, unexpected weight loss, hematochezia, or night sweats.  Hypothyroidism-   Lab Results   Component Value Date    TSH 3.680 04/04/2025     She is taking levothyroxine as prescribed without bothersome side effects.  Labs are up-to-date.       Objective   Vital Signs:  /67   Pulse 66   Temp 97.3 °F (36.3 °C)   Resp 20   Ht 157.5 cm (62.01\")   Wt 68.5 kg (151 lb)   SpO2 98%   BMI 27.61 kg/m²   Physical Exam  Vitals and nursing note reviewed.   Constitutional:       General: She is not in acute distress.     Appearance: Normal appearance. She is not toxic-appearing.   Eyes:      Pupils: Pupils are equal, round, and reactive to light.   Neck:      Vascular: No carotid bruit.   Cardiovascular:      Rate and Rhythm: Normal " rate and regular rhythm.      Heart sounds: Normal heart sounds. No murmur heard.  Pulmonary:      Effort: Pulmonary effort is normal. No respiratory distress.      Breath sounds: Normal breath sounds. No wheezing.   Abdominal:      General: Bowel sounds are normal. There is no distension.      Palpations: Abdomen is soft.      Tenderness: There is no abdominal tenderness.   Musculoskeletal:         General: Deformity present.      Right wrist: No swelling or bony tenderness. Normal range of motion.      Left wrist: Normal.      Cervical back: Normal range of motion and neck supple. No tenderness. Muscular tenderness present. No pain with movement. Normal range of motion.      Comments: Positive Phalen and Zeinab on the right   Skin:     General: Skin is warm and dry.      Findings: No rash.   Neurological:      General: No focal deficit present.      Mental Status: She is alert and oriented to person, place, and time.   Psychiatric:         Mood and Affect: Mood normal.         Behavior: Behavior normal.          Assessment and Plan Additional age appropriate preventative wellness advice topics were discussed during today's preventative wellness exam(some topics already addressed during AWV portion of the note above):   Nutrition: Discussed nutrition plan with patient. Information shared in after visit summary. Goal is for a well balanced diet to enhance overall health.     Healthy Weight: Discussed current and goal BMI with patient. Steps to attain this goal discussed. Information shared in after visit summary.     Medicare annual wellness visit, subsequent  Preventative maintenance discussed during visit and information provided in AVS.  Menopause  DEXA scan ordered.  Orders:    DEXA Bone Density Axial; Future    Generalized osteoarthritis  Encouraged to continue arthritis Tylenol, follow-up with orthopedics, stay active with walking.       Mixed hyperlipidemia  Panel UTD, heart healthy diet encouraged.          Overweight (BMI 25.0-29.9)  Patient's (Body mass index is 27.61 kg/m².) indicates that they are overweight with health conditions that include dyslipidemias . Weight is unchanged. BMI is above average; BMI management plan is completed. We discussed portion control, increasing exercise, and Information on healthy weight added to patient's after visit summary.          Gastroesophageal reflux disease without esophagitis  Avoid eating spicy foods, caffeinated and carbonated beverages, alcohol, and chocolate. Try not to eat or drink within 3 hours of going to bed at night. May elevate the head of the bed. Eat smaller portions. Adhere to medication regimen as prescribed. RTC if symptoms worsen despite interventions.          Acquired hypothyroidism  Clinically euthyroid, continue levothyroxine at current dose.       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  She has failed conservative measures and considering surgical intervention.  For pain relief we will trial low-dose gabapentin nightly, can also help with restless legs.  We discussed risks and benefits of the medication including addictive nature.  Sherman reviewed and UDS obtained.  CSA was also signed today.    Orders:    gabapentin (NEURONTIN) 300 MG capsule; Take 1 capsule by mouth Every Night.    Compliance Drug Analysis, Ur - Urine, Clean Catch    Restless leg  See above plan.  Orders:    gabapentin (NEURONTIN) 300 MG capsule; Take 1 capsule by mouth Every Night.    Compliance Drug Analysis, Ur - Urine, Clean Catch    Encounter for therapeutic drug level monitoring    Orders:    Compliance Drug Analysis, Ur - Urine, Clean Catch         Follow Up   Return in about 3 months (around 10/9/2025) for Next scheduled follow up.  Patient was given instructions and counseling regarding her condition or for health maintenance advice. Please see specific information pulled into the AVS if  appropriate.

## 2025-07-09 NOTE — PATIENT INSTRUCTIONS
Advance Care Planning and Advance Directives     You make decisions on a daily basis - decisions about where you want to live, your career, your home, your life. Perhaps one of the most important decisions you face is your choice for future medical care. Take time to talk with your family and your healthcare team and start planning today.  Advance Care Planning is a process that can help you:  Understand possible future healthcare decisions in light of your own experiences  Reflect on those decision in light of your goals and values  Discuss your decisions with those closest to you and the healthcare professionals that care for you  Make a plan by creating a document that reflects your wishes    Surrogate Decision Maker  In the event of a medical emergency, which has left you unable to communicate or to make your own decisions, you would need someone to make decisions for you.  It is important to discuss your preferences for medical treatment with this person while you are in good health.     Qualities of a surrogate decision maker:  Willing to take on this role and responsibility  Knows what you want for future medical care  Willing to follow your wishes even if they don't agree with them  Able to make difficult medical decisions under stressful circumstances    Advance Directives  These are legal documents you can create that will guide your healthcare team and decision maker(s) when needed. These documents can be stored in the electronic medical record.    Living Will - a legal document to guide your care if you have a terminal condition or a serious illness and are unable to communicate. States vary by statute in document names/types, but most forms may include one or more of the following:        -  Directions regarding life-prolonging treatments        -  Directions regarding artificially provided nutrition/hydration        -  Choosing a healthcare decision maker        -  Direction regarding organ/tissue  donation    Durable Power of  for Healthcare - this document names an -in-fact to make medical decisions for you, but it may also allow this person to make personal and financial decisions for you. Please seek the advice of an  if you need this type of document.    **Advance Directives are not required and no one may discriminate against you if you do not sign one.    Medical Orders  Many states allow specific forms/orders signed by your physician to record your wishes for medical treatment in your current state of health. This form, signed in personal communication with your physician, addresses resuscitation and other medical interventions that you may or may not want.      For more information or to schedule a time with a [facility] Advance Care Planning Facilitator contact: [place] or call [phone] and someone will contact you directly.    Medicare Wellness  Personal Prevention Plan of Service     Date of Office Visit:    Encounter Provider:  HARRY Lugo  Place of Service:  [facility]  Patient Name: Dang Butler  :  1950    As part of the Medicare Wellness portion of your visit today, we are providing you with this personalized preventive plan of services (PPPS). This plan is based upon recommendations of the [place] Preventive Services Task Force (USPSTF) and the Advisory Committee on Immunization Practices (ACIP).    This lists the preventive care services that should be considered, and provides dates of when you are due. Items listed as completed are up-to-date and do not require any further intervention.    Health Maintenance   Topic Date Due   • DXA SCAN  2024   • ANNUAL WELLNESS VISIT  2025   • MAMMOGRAM  2025   • COVID-19 Vaccine ( season) 2026 (Originally 4/3/2025)   • INFLUENZA VACCINE  10/01/2025   • LIPID PANEL  2026   • HEPATITIS C SCREENING  Completed   •  Pneumococcal Vaccine 50+  Completed   • ZOSTER VACCINE  Completed   • TDAP/TD VACCINES  Discontinued   • COLORECTAL CANCER SCREENING  Discontinued       No orders of the defined types were placed in this encounter.      No follow-ups on file.

## 2025-07-16 LAB — DRUGS UR: NORMAL

## 2025-08-14 DIAGNOSIS — M54.2 CHRONIC NECK PAIN: Primary | ICD-10-CM

## 2025-08-14 DIAGNOSIS — G89.29 CHRONIC NECK PAIN: Primary | ICD-10-CM
